# Patient Record
Sex: MALE | Race: WHITE | NOT HISPANIC OR LATINO | Employment: OTHER | URBAN - METROPOLITAN AREA
[De-identification: names, ages, dates, MRNs, and addresses within clinical notes are randomized per-mention and may not be internally consistent; named-entity substitution may affect disease eponyms.]

---

## 2022-01-01 ENCOUNTER — PATIENT OUTREACH (OUTPATIENT)
Dept: CARE COORDINATION | Facility: CLINIC | Age: 87
End: 2022-01-01

## 2022-01-01 ENCOUNTER — APPOINTMENT (OUTPATIENT)
Dept: OCCUPATIONAL THERAPY | Facility: HOSPITAL | Age: 87
DRG: 689 | End: 2022-01-01
Payer: MEDICARE

## 2022-01-01 ENCOUNTER — MEDICAL CORRESPONDENCE (OUTPATIENT)
Dept: HEALTH INFORMATION MANAGEMENT | Facility: CLINIC | Age: 87
End: 2022-01-01

## 2022-01-01 ENCOUNTER — LAB REQUISITION (OUTPATIENT)
Dept: LAB | Facility: CLINIC | Age: 87
End: 2022-01-01
Payer: MEDICARE

## 2022-01-01 ENCOUNTER — APPOINTMENT (OUTPATIENT)
Dept: PHYSICAL THERAPY | Facility: HOSPITAL | Age: 87
DRG: 689 | End: 2022-01-01
Payer: MEDICARE

## 2022-01-01 ENCOUNTER — APPOINTMENT (OUTPATIENT)
Dept: RADIOLOGY | Facility: HOSPITAL | Age: 87
End: 2022-01-01
Attending: STUDENT IN AN ORGANIZED HEALTH CARE EDUCATION/TRAINING PROGRAM
Payer: MEDICARE

## 2022-01-01 ENCOUNTER — APPOINTMENT (OUTPATIENT)
Dept: CT IMAGING | Facility: HOSPITAL | Age: 87
DRG: 689 | End: 2022-01-01
Attending: PHYSICIAN ASSISTANT
Payer: MEDICARE

## 2022-01-01 ENCOUNTER — HOSPITAL ENCOUNTER (EMERGENCY)
Facility: HOSPITAL | Age: 87
Discharge: HOME | End: 2022-11-03
Payer: MEDICARE

## 2022-01-01 ENCOUNTER — APPOINTMENT (OUTPATIENT)
Dept: CT IMAGING | Facility: HOSPITAL | Age: 87
End: 2022-01-01
Attending: STUDENT IN AN ORGANIZED HEALTH CARE EDUCATION/TRAINING PROGRAM
Payer: MEDICARE

## 2022-01-01 ENCOUNTER — APPOINTMENT (OUTPATIENT)
Dept: OCCUPATIONAL THERAPY | Facility: HOSPITAL | Age: 87
DRG: 689 | End: 2022-01-01
Attending: INTERNAL MEDICINE
Payer: MEDICARE

## 2022-01-01 ENCOUNTER — DOCUMENTATION ONLY (OUTPATIENT)
Dept: OTHER | Facility: CLINIC | Age: 87
End: 2022-01-01

## 2022-01-01 ENCOUNTER — APPOINTMENT (OUTPATIENT)
Dept: CARDIOLOGY | Facility: HOSPITAL | Age: 87
DRG: 689 | End: 2022-01-01
Attending: HOSPITALIST
Payer: MEDICARE

## 2022-01-01 ENCOUNTER — APPOINTMENT (OUTPATIENT)
Dept: PHYSICAL THERAPY | Facility: HOSPITAL | Age: 87
DRG: 689 | End: 2022-01-01
Attending: HOSPITALIST
Payer: MEDICARE

## 2022-01-01 ENCOUNTER — APPOINTMENT (OUTPATIENT)
Dept: OCCUPATIONAL THERAPY | Facility: HOSPITAL | Age: 87
DRG: 689 | End: 2022-01-01
Attending: HOSPITALIST
Payer: MEDICARE

## 2022-01-01 ENCOUNTER — HOSPITAL ENCOUNTER (INPATIENT)
Facility: HOSPITAL | Age: 87
LOS: 6 days | Discharge: NURSING, REHAB OR LONG-TERM CARE | DRG: 689 | End: 2022-11-14
Attending: EMERGENCY MEDICINE | Admitting: HOSPITALIST
Payer: MEDICARE

## 2022-01-01 ENCOUNTER — APPOINTMENT (OUTPATIENT)
Dept: RADIOLOGY | Facility: HOSPITAL | Age: 87
DRG: 689 | End: 2022-01-01
Attending: PHYSICIAN ASSISTANT
Payer: MEDICARE

## 2022-01-01 ENCOUNTER — APPOINTMENT (OUTPATIENT)
Dept: PHYSICAL THERAPY | Facility: HOSPITAL | Age: 87
DRG: 689 | End: 2022-01-01
Attending: INTERNAL MEDICINE
Payer: MEDICARE

## 2022-01-01 VITALS
RESPIRATION RATE: 18 BRPM | OXYGEN SATURATION: 96 % | HEART RATE: 67 BPM | BODY MASS INDEX: 23.09 KG/M2 | WEIGHT: 179.9 LBS | DIASTOLIC BLOOD PRESSURE: 54 MMHG | SYSTOLIC BLOOD PRESSURE: 93 MMHG | HEIGHT: 74 IN | TEMPERATURE: 97.8 F

## 2022-01-01 VITALS
RESPIRATION RATE: 18 BRPM | SYSTOLIC BLOOD PRESSURE: 109 MMHG | HEART RATE: 86 BPM | OXYGEN SATURATION: 94 % | TEMPERATURE: 98.3 F | DIASTOLIC BLOOD PRESSURE: 55 MMHG

## 2022-01-01 DIAGNOSIS — M25.562 LEFT KNEE PAIN: ICD-10-CM

## 2022-01-01 DIAGNOSIS — H54.0X44: ICD-10-CM

## 2022-01-01 DIAGNOSIS — R79.89 ELEVATED TROPONIN: ICD-10-CM

## 2022-01-01 DIAGNOSIS — M62.81 MUSCLE WEAKNESS (GENERALIZED): ICD-10-CM

## 2022-01-01 DIAGNOSIS — I10 ESSENTIAL (PRIMARY) HYPERTENSION: ICD-10-CM

## 2022-01-01 DIAGNOSIS — I48.20 CHRONIC ATRIAL FIBRILLATION, UNSPECIFIED (H): ICD-10-CM

## 2022-01-01 DIAGNOSIS — N30.00 ACUTE CYSTITIS WITHOUT HEMATURIA: ICD-10-CM
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
## 2022-01-01 DIAGNOSIS — E78.5 HYPERLIPIDEMIA, UNSPECIFIED: ICD-10-CM

## 2022-01-01 DIAGNOSIS — I48.0 PAROXYSMAL ATRIAL FIBRILLATION (H): ICD-10-CM

## 2022-01-01 DIAGNOSIS — I48.20 CHRONIC ATRIAL FIBRILLATION (H): ICD-10-CM

## 2022-01-01 DIAGNOSIS — D12.6 BENIGN NEOPLASM OF COLON, UNSPECIFIED: ICD-10-CM

## 2022-01-01 DIAGNOSIS — I10 ESSENTIAL HYPERTENSION: Primary | ICD-10-CM

## 2022-01-01 DIAGNOSIS — L89.323 PRESSURE ULCER OF LEFT BUTTOCK, STAGE 3 (H): ICD-10-CM

## 2022-01-01 DIAGNOSIS — N18.30 CKD (CHRONIC KIDNEY DISEASE) STAGE 3, GFR 30-59 ML/MIN (H): ICD-10-CM

## 2022-01-01 DIAGNOSIS — I50.9 HEART FAILURE, UNSPECIFIED (H): ICD-10-CM

## 2022-01-01 DIAGNOSIS — N39.0 URINARY TRACT INFECTION: ICD-10-CM

## 2022-01-01 DIAGNOSIS — Z95.3 S/P TAVR (TRANSCATHETER AORTIC VALVE REPLACEMENT), BIOPROSTHETIC: ICD-10-CM

## 2022-01-01 DIAGNOSIS — R79.1 SUPRATHERAPEUTIC INR: ICD-10-CM

## 2022-01-01 DIAGNOSIS — I25.10 CORONARY ARTERY DISEASE INVOLVING NATIVE CORONARY ARTERY OF NATIVE HEART WITHOUT ANGINA PECTORIS: ICD-10-CM

## 2022-01-01 LAB
ALBUMIN UR-MCNC: 70 MG/DL
ANION GAP SERPL CALCULATED.3IONS-SCNC: 10 MMOL/L (ref 7–15)
ANION GAP SERPL CALCULATED.3IONS-SCNC: 11 MMOL/L (ref 7–15)
ANION GAP SERPL CALCULATED.3IONS-SCNC: 13 MMOL/L (ref 7–15)
ANION GAP SERPL CALCULATED.3IONS-SCNC: 13 MMOL/L (ref 7–15)
ANION GAP SERPL CALCULATED.3IONS-SCNC: 14 MMOL/L (ref 7–15)
ANION GAP SERPL CALCULATED.3IONS-SCNC: 7 MMOL/L (ref 7–15)
ANION GAP SERPL CALCULATED.3IONS-SCNC: 8 MMOL/L (ref 7–15)
ANION GAP SERPL CALCULATED.3IONS-SCNC: 9 MMOL/L (ref 7–15)
ANION GAP SERPL CALCULATED.3IONS-SCNC: 9 MMOL/L (ref 7–15)
APPEARANCE UR: ABNORMAL
BACTERIA #/AREA URNS HPF: ABNORMAL /HPF
BACTERIA UR CULT: ABNORMAL
BACTERIA UR CULT: ABNORMAL
BASOPHILS # BLD AUTO: 0 10E3/UL (ref 0–0.2)
BASOPHILS NFR BLD AUTO: 0 %
BILIRUB UR QL STRIP: NEGATIVE
BUN SERPL-MCNC: 17.9 MG/DL (ref 8–23)
BUN SERPL-MCNC: 18.7 MG/DL (ref 8–23)
BUN SERPL-MCNC: 19.3 MG/DL (ref 8–23)
BUN SERPL-MCNC: 20.8 MG/DL (ref 8–23)
BUN SERPL-MCNC: 20.9 MG/DL (ref 8–23)
BUN SERPL-MCNC: 22 MG/DL (ref 8–23)
BUN SERPL-MCNC: 23 MG/DL (ref 8–23)
BUN SERPL-MCNC: 25.1 MG/DL (ref 8–23)
BUN SERPL-MCNC: 27.7 MG/DL (ref 8–23)
BUN SERPL-MCNC: 27.9 MG/DL (ref 8–23)
BUN SERPL-MCNC: 28.3 MG/DL (ref 8–23)
BUN SERPL-MCNC: 29.5 MG/DL (ref 8–23)
BUN SERPL-MCNC: 33.6 MG/DL (ref 8–23)
CALCIUM SERPL-MCNC: 8.1 MG/DL (ref 8.8–10.2)
CALCIUM SERPL-MCNC: 8.3 MG/DL (ref 8.8–10.2)
CALCIUM SERPL-MCNC: 8.3 MG/DL (ref 8.8–10.2)
CALCIUM SERPL-MCNC: 8.4 MG/DL (ref 8.8–10.2)
CALCIUM SERPL-MCNC: 8.4 MG/DL (ref 8.8–10.2)
CALCIUM SERPL-MCNC: 8.6 MG/DL (ref 8.8–10.2)
CALCIUM SERPL-MCNC: 8.7 MG/DL (ref 8.8–10.2)
CALCIUM SERPL-MCNC: 8.7 MG/DL (ref 8.8–10.2)
CALCIUM SERPL-MCNC: 8.8 MG/DL (ref 8.8–10.2)
CALCIUM SERPL-MCNC: 9 MG/DL (ref 8.8–10.2)
CALCIUM SERPL-MCNC: 9.3 MG/DL (ref 8.8–10.2)
CHLORIDE SERPL-SCNC: 100 MMOL/L (ref 98–107)
CHLORIDE SERPL-SCNC: 102 MMOL/L (ref 98–107)
CHLORIDE SERPL-SCNC: 102 MMOL/L (ref 98–107)
CHLORIDE SERPL-SCNC: 103 MMOL/L (ref 98–107)
CHLORIDE SERPL-SCNC: 104 MMOL/L (ref 98–107)
CHLORIDE SERPL-SCNC: 104 MMOL/L (ref 98–107)
CHLORIDE SERPL-SCNC: 105 MMOL/L (ref 98–107)
CHLORIDE SERPL-SCNC: 105 MMOL/L (ref 98–107)
CHLORIDE SERPL-SCNC: 106 MMOL/L (ref 98–107)
CHLORIDE SERPL-SCNC: 106 MMOL/L (ref 98–107)
CHLORIDE SERPL-SCNC: 107 MMOL/L (ref 98–107)
CHLORIDE SERPL-SCNC: 108 MMOL/L (ref 98–107)
CHLORIDE SERPL-SCNC: 108 MMOL/L (ref 98–107)
COLOR UR AUTO: YELLOW
CREAT SERPL-MCNC: 0.79 MG/DL (ref 0.67–1.17)
CREAT SERPL-MCNC: 0.85 MG/DL (ref 0.67–1.17)
CREAT SERPL-MCNC: 0.89 MG/DL (ref 0.67–1.17)
CREAT SERPL-MCNC: 0.91 MG/DL (ref 0.67–1.17)
CREAT SERPL-MCNC: 0.92 MG/DL (ref 0.67–1.17)
CREAT SERPL-MCNC: 0.92 MG/DL (ref 0.67–1.17)
CREAT SERPL-MCNC: 0.94 MG/DL (ref 0.67–1.17)
CREAT SERPL-MCNC: 0.99 MG/DL (ref 0.67–1.17)
CREAT SERPL-MCNC: 1.02 MG/DL (ref 0.67–1.17)
CREAT SERPL-MCNC: 1.03 MG/DL (ref 0.67–1.17)
CREAT SERPL-MCNC: 1.05 MG/DL (ref 0.67–1.17)
CREAT SERPL-MCNC: 1.28 MG/DL (ref 0.67–1.17)
CREAT SERPL-MCNC: 1.3 MG/DL (ref 0.67–1.17)
DEPRECATED HCO3 PLAS-SCNC: 21 MMOL/L (ref 22–29)
DEPRECATED HCO3 PLAS-SCNC: 22 MMOL/L (ref 22–29)
DEPRECATED HCO3 PLAS-SCNC: 23 MMOL/L (ref 22–29)
DEPRECATED HCO3 PLAS-SCNC: 24 MMOL/L (ref 22–29)
DEPRECATED HCO3 PLAS-SCNC: 25 MMOL/L (ref 22–29)
DEPRECATED HCO3 PLAS-SCNC: 25 MMOL/L (ref 22–29)
EOSINOPHIL # BLD AUTO: 0.1 10E3/UL (ref 0–0.7)
EOSINOPHIL NFR BLD AUTO: 1 %
ERYTHROCYTE [DISTWIDTH] IN BLOOD BY AUTOMATED COUNT: 16.9 % (ref 10–15)
ERYTHROCYTE [DISTWIDTH] IN BLOOD BY AUTOMATED COUNT: 17 % (ref 10–15)
ERYTHROCYTE [DISTWIDTH] IN BLOOD BY AUTOMATED COUNT: 17.1 % (ref 10–15)
ERYTHROCYTE [DISTWIDTH] IN BLOOD BY AUTOMATED COUNT: 17.1 % (ref 10–15)
ERYTHROCYTE [DISTWIDTH] IN BLOOD BY AUTOMATED COUNT: 17.2 % (ref 10–15)
ERYTHROCYTE [DISTWIDTH] IN BLOOD BY AUTOMATED COUNT: 17.5 % (ref 10–15)
ERYTHROCYTE [DISTWIDTH] IN BLOOD BY AUTOMATED COUNT: 17.8 % (ref 10–15)
GFR SERPL CREATININE-BSD FRML MDRD: 53 ML/MIN/1.73M2
GFR SERPL CREATININE-BSD FRML MDRD: 53 ML/MIN/1.73M2
GFR SERPL CREATININE-BSD FRML MDRD: 68 ML/MIN/1.73M2
GFR SERPL CREATININE-BSD FRML MDRD: 69 ML/MIN/1.73M2
GFR SERPL CREATININE-BSD FRML MDRD: 70 ML/MIN/1.73M2
GFR SERPL CREATININE-BSD FRML MDRD: 73 ML/MIN/1.73M2
GFR SERPL CREATININE-BSD FRML MDRD: 77 ML/MIN/1.73M2
GFR SERPL CREATININE-BSD FRML MDRD: 80 ML/MIN/1.73M2
GFR SERPL CREATININE-BSD FRML MDRD: 80 ML/MIN/1.73M2
GFR SERPL CREATININE-BSD FRML MDRD: 81 ML/MIN/1.73M2
GFR SERPL CREATININE-BSD FRML MDRD: 82 ML/MIN/1.73M2
GFR SERPL CREATININE-BSD FRML MDRD: 83 ML/MIN/1.73M2
GFR SERPL CREATININE-BSD FRML MDRD: 85 ML/MIN/1.73M2
GLUCOSE SERPL-MCNC: 110 MG/DL (ref 70–99)
GLUCOSE SERPL-MCNC: 111 MG/DL (ref 70–99)
GLUCOSE SERPL-MCNC: 74 MG/DL (ref 70–99)
GLUCOSE SERPL-MCNC: 81 MG/DL (ref 70–99)
GLUCOSE SERPL-MCNC: 82 MG/DL (ref 70–99)
GLUCOSE SERPL-MCNC: 83 MG/DL (ref 70–99)
GLUCOSE SERPL-MCNC: 84 MG/DL (ref 70–99)
GLUCOSE SERPL-MCNC: 86 MG/DL (ref 70–99)
GLUCOSE SERPL-MCNC: 88 MG/DL (ref 70–99)
GLUCOSE SERPL-MCNC: 90 MG/DL (ref 70–99)
GLUCOSE SERPL-MCNC: 93 MG/DL (ref 70–99)
GLUCOSE SERPL-MCNC: 95 MG/DL (ref 70–99)
GLUCOSE SERPL-MCNC: 95 MG/DL (ref 70–99)
GLUCOSE UR STRIP-MCNC: NEGATIVE MG/DL
HCT VFR BLD AUTO: 30.2 % (ref 40–53)
HCT VFR BLD AUTO: 31.1 % (ref 40–53)
HCT VFR BLD AUTO: 31.2 % (ref 40–53)
HCT VFR BLD AUTO: 31.5 % (ref 40–53)
HCT VFR BLD AUTO: 31.6 % (ref 40–53)
HCT VFR BLD AUTO: 32.2 % (ref 40–53)
HCT VFR BLD AUTO: 32.5 % (ref 40–53)
HCT VFR BLD AUTO: 33.7 % (ref 40–53)
HCT VFR BLD AUTO: 33.8 % (ref 40–53)
HCT VFR BLD AUTO: 34.6 % (ref 40–53)
HCT VFR BLD AUTO: 34.7 % (ref 40–53)
HCT VFR BLD AUTO: 35.5 % (ref 40–53)
HCT VFR BLD AUTO: 37.6 % (ref 40–53)
HGB BLD-MCNC: 10 G/DL (ref 13.3–17.7)
HGB BLD-MCNC: 10.2 G/DL (ref 13.3–17.7)
HGB BLD-MCNC: 10.3 G/DL (ref 13.3–17.7)
HGB BLD-MCNC: 10.5 G/DL (ref 13.3–17.7)
HGB BLD-MCNC: 10.8 G/DL (ref 13.3–17.7)
HGB BLD-MCNC: 10.9 G/DL (ref 13.3–17.7)
HGB BLD-MCNC: 11.1 G/DL (ref 13.3–17.7)
HGB BLD-MCNC: 11.3 G/DL (ref 13.3–17.7)
HGB BLD-MCNC: 12.1 G/DL (ref 13.3–17.7)
HGB BLD-MCNC: 9.8 G/DL (ref 13.3–17.7)
HGB BLD-MCNC: 9.9 G/DL (ref 13.3–17.7)
HGB UR QL STRIP: ABNORMAL
IMM GRANULOCYTES # BLD: 0 10E3/UL
IMM GRANULOCYTES NFR BLD: 1 %
INR PPP: 1.24 (ref 0.85–1.15)
INR PPP: 1.37 (ref 0.85–1.15)
INR PPP: 1.52 (ref 0.85–1.15)
INR PPP: 1.58 (ref 0.85–1.15)
INR PPP: 1.82 (ref 0.85–1.15)
INR PPP: 2.01 (ref 0.85–1.15)
INR PPP: 2.06 (ref 0.85–1.15)
INR PPP: 2.12 (ref 0.85–1.15)
INR PPP: 2.32 (ref 0.85–1.15)
INR PPP: 2.34 (ref 0.85–1.15)
INR PPP: 2.38 (ref 0.85–1.15)
INR PPP: 2.41 (ref 0.85–1.15)
INR PPP: 2.44 (ref 0.85–1.15)
INR PPP: 2.53 (ref 0.85–1.15)
INR PPP: 2.6 (ref 0.85–1.15)
INR PPP: 2.67 (ref 0.85–1.15)
INR PPP: 2.7 (ref 0.85–1.15)
INR PPP: 2.72 (ref 0.85–1.15)
INR PPP: 3.16 (ref 0.85–1.15)
INR PPP: 3.52 (ref 0.85–1.15)
INR PPP: 3.78 (ref 0.85–1.15)
INR PPP: 3.91 (ref 0.85–1.15)
INR PPP: 4.07 (ref 0.85–1.15)
INR PPP: 4.57 (ref 0.85–1.15)
INR PPP: 5.28 (ref 0.85–1.15)
KETONES UR STRIP-MCNC: NEGATIVE MG/DL
LACTATE SERPL-SCNC: 1.4 MMOL/L (ref 0.7–2)
LEUKOCYTE ESTERASE UR QL STRIP: ABNORMAL
LVEF ECHO: NORMAL
LYMPHOCYTES # BLD AUTO: 1.1 10E3/UL (ref 0.8–5.3)
LYMPHOCYTES NFR BLD AUTO: 17 %
MAGNESIUM SERPL-MCNC: 1.9 MG/DL (ref 1.7–2.3)
MAGNESIUM SERPL-MCNC: 2 MG/DL (ref 1.7–2.3)
MAGNESIUM SERPL-MCNC: 2.1 MG/DL (ref 1.7–2.3)
MAGNESIUM SERPL-MCNC: 2.2 MG/DL (ref 1.7–2.3)
MAGNESIUM SERPL-MCNC: 2.2 MG/DL (ref 1.7–2.3)
MCH RBC QN AUTO: 27.6 PG (ref 26.5–33)
MCH RBC QN AUTO: 27.7 PG (ref 26.5–33)
MCH RBC QN AUTO: 27.8 PG (ref 26.5–33)
MCH RBC QN AUTO: 27.9 PG (ref 26.5–33)
MCH RBC QN AUTO: 27.9 PG (ref 26.5–33)
MCH RBC QN AUTO: 28 PG (ref 26.5–33)
MCH RBC QN AUTO: 28.1 PG (ref 26.5–33)
MCH RBC QN AUTO: 28.2 PG (ref 26.5–33)
MCH RBC QN AUTO: 28.2 PG (ref 26.5–33)
MCHC RBC AUTO-ENTMCNC: 31.1 G/DL (ref 31.5–36.5)
MCHC RBC AUTO-ENTMCNC: 31.2 G/DL (ref 31.5–36.5)
MCHC RBC AUTO-ENTMCNC: 31.3 G/DL (ref 31.5–36.5)
MCHC RBC AUTO-ENTMCNC: 31.3 G/DL (ref 31.5–36.5)
MCHC RBC AUTO-ENTMCNC: 31.4 G/DL (ref 31.5–36.5)
MCHC RBC AUTO-ENTMCNC: 31.4 G/DL (ref 31.5–36.5)
MCHC RBC AUTO-ENTMCNC: 31.8 G/DL (ref 31.5–36.5)
MCHC RBC AUTO-ENTMCNC: 32 G/DL (ref 31.5–36.5)
MCHC RBC AUTO-ENTMCNC: 32.1 G/DL (ref 31.5–36.5)
MCHC RBC AUTO-ENTMCNC: 32.2 G/DL (ref 31.5–36.5)
MCHC RBC AUTO-ENTMCNC: 32.3 G/DL (ref 31.5–36.5)
MCHC RBC AUTO-ENTMCNC: 32.5 G/DL (ref 31.5–36.5)
MCHC RBC AUTO-ENTMCNC: 32.6 G/DL (ref 31.5–36.5)
MCV RBC AUTO: 87 FL (ref 78–100)
MCV RBC AUTO: 88 FL (ref 78–100)
MCV RBC AUTO: 88 FL (ref 78–100)
MCV RBC AUTO: 89 FL (ref 78–100)
MCV RBC AUTO: 90 FL (ref 78–100)
MONOCYTES # BLD AUTO: 0.7 10E3/UL (ref 0–1.3)
MONOCYTES NFR BLD AUTO: 10 %
NEUTROPHILS # BLD AUTO: 4.8 10E3/UL (ref 1.6–8.3)
NEUTROPHILS NFR BLD AUTO: 71 %
NITRATE UR QL: NEGATIVE
NRBC # BLD AUTO: 0 10E3/UL
NRBC BLD AUTO-RTO: 0 /100
PH UR STRIP: 8 [PH] (ref 5–7)
PLATELET # BLD AUTO: 192 10E3/UL (ref 150–450)
PLATELET # BLD AUTO: 220 10E3/UL (ref 150–450)
PLATELET # BLD AUTO: 220 10E3/UL (ref 150–450)
PLATELET # BLD AUTO: 225 10E3/UL (ref 150–450)
PLATELET # BLD AUTO: 230 10E3/UL (ref 150–450)
PLATELET # BLD AUTO: 234 10E3/UL (ref 150–450)
PLATELET # BLD AUTO: 235 10E3/UL (ref 150–450)
PLATELET # BLD AUTO: 239 10E3/UL (ref 150–450)
PLATELET # BLD AUTO: 244 10E3/UL (ref 150–450)
PLATELET # BLD AUTO: 251 10E3/UL (ref 150–450)
PLATELET # BLD AUTO: 253 10E3/UL (ref 150–450)
PLATELET # BLD AUTO: 288 10E3/UL (ref 150–450)
PLATELET # BLD AUTO: 303 10E3/UL (ref 150–450)
POTASSIUM SERPL-SCNC: 3.2 MMOL/L (ref 3.4–5.3)
POTASSIUM SERPL-SCNC: 3.3 MMOL/L (ref 3.4–5.3)
POTASSIUM SERPL-SCNC: 3.5 MMOL/L (ref 3.4–5.3)
POTASSIUM SERPL-SCNC: 3.5 MMOL/L (ref 3.4–5.3)
POTASSIUM SERPL-SCNC: 3.7 MMOL/L (ref 3.4–5.3)
POTASSIUM SERPL-SCNC: 3.8 MMOL/L (ref 3.4–5.3)
POTASSIUM SERPL-SCNC: 3.8 MMOL/L (ref 3.4–5.3)
POTASSIUM SERPL-SCNC: 3.9 MMOL/L (ref 3.4–5.3)
POTASSIUM SERPL-SCNC: 4 MMOL/L (ref 3.4–5.3)
POTASSIUM SERPL-SCNC: 4.1 MMOL/L (ref 3.4–5.3)
POTASSIUM SERPL-SCNC: 4.2 MMOL/L (ref 3.4–5.3)
RBC # BLD AUTO: 3.47 10E6/UL (ref 4.4–5.9)
RBC # BLD AUTO: 3.52 10E6/UL (ref 4.4–5.9)
RBC # BLD AUTO: 3.56 10E6/UL (ref 4.4–5.9)
RBC # BLD AUTO: 3.56 10E6/UL (ref 4.4–5.9)
RBC # BLD AUTO: 3.59 10E6/UL (ref 4.4–5.9)
RBC # BLD AUTO: 3.66 10E6/UL (ref 4.4–5.9)
RBC # BLD AUTO: 3.69 10E6/UL (ref 4.4–5.9)
RBC # BLD AUTO: 3.79 10E6/UL (ref 4.4–5.9)
RBC # BLD AUTO: 3.88 10E6/UL (ref 4.4–5.9)
RBC # BLD AUTO: 3.89 10E6/UL (ref 4.4–5.9)
RBC # BLD AUTO: 3.99 10E6/UL (ref 4.4–5.9)
RBC # BLD AUTO: 4.01 10E6/UL (ref 4.4–5.9)
RBC # BLD AUTO: 4.3 10E6/UL (ref 4.4–5.9)
RBC URINE: 100 /HPF
SARS-COV-2 RNA RESP QL NAA+PROBE: NEGATIVE
SODIUM SERPL-SCNC: 132 MMOL/L (ref 136–145)
SODIUM SERPL-SCNC: 134 MMOL/L (ref 136–145)
SODIUM SERPL-SCNC: 137 MMOL/L (ref 136–145)
SODIUM SERPL-SCNC: 137 MMOL/L (ref 136–145)
SODIUM SERPL-SCNC: 138 MMOL/L (ref 136–145)
SODIUM SERPL-SCNC: 138 MMOL/L (ref 136–145)
SODIUM SERPL-SCNC: 139 MMOL/L (ref 136–145)
SODIUM SERPL-SCNC: 139 MMOL/L (ref 136–145)
SODIUM SERPL-SCNC: 140 MMOL/L (ref 136–145)
SODIUM SERPL-SCNC: 141 MMOL/L (ref 136–145)
SODIUM SERPL-SCNC: 141 MMOL/L (ref 136–145)
SP GR UR STRIP: 1.01 (ref 1–1.03)
SQUAMOUS EPITHELIAL: 1 /HPF
TROPONIN T SERPL HS-MCNC: 121 NG/L
TROPONIN T SERPL HS-MCNC: 123 NG/L
TSH SERPL DL<=0.005 MIU/L-ACNC: 2.39 UIU/ML (ref 0.3–4.2)
UROBILINOGEN UR STRIP-MCNC: <2 MG/DL
VIT B1 PYROPHOSHATE BLD-SCNC: 52 NMOL/L
VIT B12 SERPL-MCNC: 259 PG/ML (ref 232–1245)
WBC # BLD AUTO: 5.4 10E3/UL (ref 4–11)
WBC # BLD AUTO: 6.1 10E3/UL (ref 4–11)
WBC # BLD AUTO: 6.5 10E3/UL (ref 4–11)
WBC # BLD AUTO: 6.5 10E3/UL (ref 4–11)
WBC # BLD AUTO: 6.7 10E3/UL (ref 4–11)
WBC # BLD AUTO: 7 10E3/UL (ref 4–11)
WBC # BLD AUTO: 7.2 10E3/UL (ref 4–11)
WBC # BLD AUTO: 7.2 10E3/UL (ref 4–11)
WBC # BLD AUTO: 7.7 10E3/UL (ref 4–11)
WBC # BLD AUTO: 7.7 10E3/UL (ref 4–11)
WBC # BLD AUTO: 7.8 10E3/UL (ref 4–11)
WBC # BLD AUTO: 7.9 10E3/UL (ref 4–11)
WBC # BLD AUTO: 8 10E3/UL (ref 4–11)
WBC CLUMPS #/AREA URNS HPF: PRESENT /HPF
WBC URINE: >182 /HPF

## 2022-01-01 PROCEDURE — 36415 COLL VENOUS BLD VENIPUNCTURE: CPT | Performed by: STUDENT IN AN ORGANIZED HEALTH CARE EDUCATION/TRAINING PROGRAM

## 2022-01-01 PROCEDURE — 250N000013 HC RX MED GY IP 250 OP 250 PS 637: Performed by: HOSPITALIST

## 2022-01-01 PROCEDURE — P9603 ONE-WAY ALLOW PRORATED MILES: HCPCS | Mod: ORL | Performed by: FAMILY MEDICINE

## 2022-01-01 PROCEDURE — 93005 ELECTROCARDIOGRAM TRACING: CPT

## 2022-01-01 PROCEDURE — 80048 BASIC METABOLIC PNL TOTAL CA: CPT | Performed by: INTERNAL MEDICINE

## 2022-01-01 PROCEDURE — 85610 PROTHROMBIN TIME: CPT | Mod: ORL | Performed by: NURSE PRACTITIONER

## 2022-01-01 PROCEDURE — 250N000013 HC RX MED GY IP 250 OP 250 PS 637: Performed by: PHYSICIAN ASSISTANT

## 2022-01-01 PROCEDURE — 36415 COLL VENOUS BLD VENIPUNCTURE: CPT | Performed by: FAMILY MEDICINE

## 2022-01-01 PROCEDURE — 250N000011 HC RX IP 250 OP 636: Performed by: HOSPITALIST

## 2022-01-01 PROCEDURE — 83735 ASSAY OF MAGNESIUM: CPT | Performed by: INTERNAL MEDICINE

## 2022-01-01 PROCEDURE — 85610 PROTHROMBIN TIME: CPT | Performed by: HOSPITALIST

## 2022-01-01 PROCEDURE — 93005 ELECTROCARDIOGRAM TRACING: CPT | Performed by: STUDENT IN AN ORGANIZED HEALTH CARE EDUCATION/TRAINING PROGRAM

## 2022-01-01 PROCEDURE — 250N000013 HC RX MED GY IP 250 OP 250 PS 637: Performed by: INTERNAL MEDICINE

## 2022-01-01 PROCEDURE — G0463 HOSPITAL OUTPT CLINIC VISIT: HCPCS

## 2022-01-01 PROCEDURE — 36415 COLL VENOUS BLD VENIPUNCTURE: CPT | Performed by: NURSE PRACTITIONER

## 2022-01-01 PROCEDURE — G1010 CDSM STANSON: HCPCS

## 2022-01-01 PROCEDURE — 85610 PROTHROMBIN TIME: CPT | Performed by: STUDENT IN AN ORGANIZED HEALTH CARE EDUCATION/TRAINING PROGRAM

## 2022-01-01 PROCEDURE — 99232 SBSQ HOSP IP/OBS MODERATE 35: CPT | Performed by: HOSPITALIST

## 2022-01-01 PROCEDURE — 85027 COMPLETE CBC AUTOMATED: CPT | Performed by: NURSE PRACTITIONER

## 2022-01-01 PROCEDURE — 97535 SELF CARE MNGMENT TRAINING: CPT | Mod: GO

## 2022-01-01 PROCEDURE — 80048 BASIC METABOLIC PNL TOTAL CA: CPT | Performed by: NURSE PRACTITIONER

## 2022-01-01 PROCEDURE — P9604 ONE-WAY ALLOW PRORATED TRIP: HCPCS | Performed by: NURSE PRACTITIONER

## 2022-01-01 PROCEDURE — 36415 COLL VENOUS BLD VENIPUNCTURE: CPT | Mod: ORL | Performed by: NURSE PRACTITIONER

## 2022-01-01 PROCEDURE — 85014 HEMATOCRIT: CPT | Performed by: INTERNAL MEDICINE

## 2022-01-01 PROCEDURE — 120N000001 HC R&B MED SURG/OB

## 2022-01-01 PROCEDURE — P9604 ONE-WAY ALLOW PRORATED TRIP: HCPCS | Mod: ORL | Performed by: NURSE PRACTITIONER

## 2022-01-01 PROCEDURE — 36415 COLL VENOUS BLD VENIPUNCTURE: CPT | Performed by: INTERNAL MEDICINE

## 2022-01-01 PROCEDURE — 80048 BASIC METABOLIC PNL TOTAL CA: CPT | Performed by: PHYSICIAN ASSISTANT

## 2022-01-01 PROCEDURE — 36415 COLL VENOUS BLD VENIPUNCTURE: CPT | Mod: ORL | Performed by: FAMILY MEDICINE

## 2022-01-01 PROCEDURE — 85027 COMPLETE CBC AUTOMATED: CPT | Performed by: INTERNAL MEDICINE

## 2022-01-01 PROCEDURE — 73610 X-RAY EXAM OF ANKLE: CPT | Mod: LT

## 2022-01-01 PROCEDURE — 84484 ASSAY OF TROPONIN QUANT: CPT | Performed by: PHYSICIAN ASSISTANT

## 2022-01-01 PROCEDURE — 83735 ASSAY OF MAGNESIUM: CPT | Performed by: HOSPITALIST

## 2022-01-01 PROCEDURE — 82607 VITAMIN B-12: CPT | Performed by: INTERNAL MEDICINE

## 2022-01-01 PROCEDURE — 85025 COMPLETE CBC W/AUTO DIFF WBC: CPT | Performed by: HOSPITALIST

## 2022-01-01 PROCEDURE — 84425 ASSAY OF VITAMIN B-1: CPT | Performed by: INTERNAL MEDICINE

## 2022-01-01 PROCEDURE — 85610 PROTHROMBIN TIME: CPT | Mod: ORL | Performed by: FAMILY MEDICINE

## 2022-01-01 PROCEDURE — 36415 COLL VENOUS BLD VENIPUNCTURE: CPT | Performed by: HOSPITALIST

## 2022-01-01 PROCEDURE — 258N000003 HC RX IP 258 OP 636

## 2022-01-01 PROCEDURE — 80048 BASIC METABOLIC PNL TOTAL CA: CPT | Performed by: STUDENT IN AN ORGANIZED HEALTH CARE EDUCATION/TRAINING PROGRAM

## 2022-01-01 PROCEDURE — 255N000002 HC RX 255 OP 636: Performed by: HOSPITALIST

## 2022-01-01 PROCEDURE — 85027 COMPLETE CBC AUTOMATED: CPT | Performed by: PHYSICIAN ASSISTANT

## 2022-01-01 PROCEDURE — 97110 THERAPEUTIC EXERCISES: CPT | Mod: GP

## 2022-01-01 PROCEDURE — 99207 PR NO CHARGE LOS: CPT | Performed by: INTERNAL MEDICINE

## 2022-01-01 PROCEDURE — P9604 ONE-WAY ALLOW PRORATED TRIP: HCPCS | Performed by: FAMILY MEDICINE

## 2022-01-01 PROCEDURE — 36415 COLL VENOUS BLD VENIPUNCTURE: CPT | Performed by: EMERGENCY MEDICINE

## 2022-01-01 PROCEDURE — 250N000011 HC RX IP 250 OP 636: Performed by: PHYSICIAN ASSISTANT

## 2022-01-01 PROCEDURE — 84132 ASSAY OF SERUM POTASSIUM: CPT | Performed by: INTERNAL MEDICINE

## 2022-01-01 PROCEDURE — 84443 ASSAY THYROID STIM HORMONE: CPT | Performed by: INTERNAL MEDICINE

## 2022-01-01 PROCEDURE — 97162 PT EVAL MOD COMPLEX 30 MIN: CPT | Mod: GP

## 2022-01-01 PROCEDURE — 73560 X-RAY EXAM OF KNEE 1 OR 2: CPT | Mod: LT

## 2022-01-01 PROCEDURE — P9604 ONE-WAY ALLOW PRORATED TRIP: HCPCS | Mod: ORL | Performed by: FAMILY MEDICINE

## 2022-01-01 PROCEDURE — 99232 SBSQ HOSP IP/OBS MODERATE 35: CPT | Performed by: INTERNAL MEDICINE

## 2022-01-01 PROCEDURE — 85027 COMPLETE CBC AUTOMATED: CPT | Performed by: FAMILY MEDICINE

## 2022-01-01 PROCEDURE — 97530 THERAPEUTIC ACTIVITIES: CPT | Mod: GP

## 2022-01-01 PROCEDURE — 97110 THERAPEUTIC EXERCISES: CPT | Mod: GO

## 2022-01-01 PROCEDURE — P9603 ONE-WAY ALLOW PRORATED MILES: HCPCS | Mod: ORL | Performed by: NURSE PRACTITIONER

## 2022-01-01 PROCEDURE — 99285 EMERGENCY DEPT VISIT HI MDM: CPT | Mod: 25

## 2022-01-01 PROCEDURE — 80048 BASIC METABOLIC PNL TOTAL CA: CPT | Performed by: FAMILY MEDICINE

## 2022-01-01 PROCEDURE — C9803 HOPD COVID-19 SPEC COLLECT: HCPCS

## 2022-01-01 PROCEDURE — 85610 PROTHROMBIN TIME: CPT | Performed by: PHYSICIAN ASSISTANT

## 2022-01-01 PROCEDURE — 99223 1ST HOSP IP/OBS HIGH 75: CPT | Performed by: HOSPITALIST

## 2022-01-01 PROCEDURE — 93306 TTE W/DOPPLER COMPLETE: CPT | Mod: 26 | Performed by: GENERAL ACUTE CARE HOSPITAL

## 2022-01-01 PROCEDURE — 80048 BASIC METABOLIC PNL TOTAL CA: CPT | Performed by: HOSPITALIST

## 2022-01-01 PROCEDURE — 97166 OT EVAL MOD COMPLEX 45 MIN: CPT | Mod: GO

## 2022-01-01 PROCEDURE — 71045 X-RAY EXAM CHEST 1 VIEW: CPT

## 2022-01-01 PROCEDURE — 83735 ASSAY OF MAGNESIUM: CPT | Performed by: FAMILY MEDICINE

## 2022-01-01 PROCEDURE — 87088 URINE BACTERIA CULTURE: CPT | Performed by: PHYSICIAN ASSISTANT

## 2022-01-01 PROCEDURE — 99238 HOSP IP/OBS DSCHRG MGMT 30/<: CPT | Performed by: INTERNAL MEDICINE

## 2022-01-01 PROCEDURE — U0005 INFEC AGEN DETEC AMPLI PROBE: HCPCS | Performed by: PHYSICIAN ASSISTANT

## 2022-01-01 PROCEDURE — 36415 COLL VENOUS BLD VENIPUNCTURE: CPT | Performed by: PHYSICIAN ASSISTANT

## 2022-01-01 PROCEDURE — 72131 CT LUMBAR SPINE W/O DYE: CPT | Mod: ME

## 2022-01-01 PROCEDURE — 85027 COMPLETE CBC AUTOMATED: CPT | Performed by: STUDENT IN AN ORGANIZED HEALTH CARE EDUCATION/TRAINING PROGRAM

## 2022-01-01 PROCEDURE — 81003 URINALYSIS AUTO W/O SCOPE: CPT | Performed by: PHYSICIAN ASSISTANT

## 2022-01-01 PROCEDURE — 99223 1ST HOSP IP/OBS HIGH 75: CPT | Mod: 25 | Performed by: GENERAL ACUTE CARE HOSPITAL

## 2022-01-01 PROCEDURE — 93005 ELECTROCARDIOGRAM TRACING: CPT | Performed by: PHYSICIAN ASSISTANT

## 2022-01-01 PROCEDURE — 83605 ASSAY OF LACTIC ACID: CPT | Performed by: EMERGENCY MEDICINE

## 2022-01-01 PROCEDURE — 84484 ASSAY OF TROPONIN QUANT: CPT | Performed by: HOSPITALIST

## 2022-01-01 RX ORDER — BISACODYL 10 MG
10 SUPPOSITORY, RECTAL RECTAL DAILY PRN
Status: DISCONTINUED | OUTPATIENT
Start: 2022-01-01 | End: 2022-01-01 | Stop reason: HOSPADM

## 2022-01-01 RX ORDER — POTASSIUM CHLORIDE 1500 MG/1
40 TABLET, EXTENDED RELEASE ORAL ONCE
Status: COMPLETED | OUTPATIENT
Start: 2022-01-01 | End: 2022-01-01

## 2022-01-01 RX ORDER — WARFARIN SODIUM 1 MG/1
1 TABLET ORAL
Status: DISCONTINUED | OUTPATIENT
Start: 2022-01-01 | End: 2022-01-01 | Stop reason: HOSPADM

## 2022-01-01 RX ORDER — FUROSEMIDE 20 MG
20 TABLET ORAL 2 TIMES DAILY
COMMUNITY

## 2022-01-01 RX ORDER — VIT C/E/ZN/COPPR/LUTEIN/ZEAXAN 60 MG-6 MG
1 CAPSULE ORAL 2 TIMES DAILY
Status: DISCONTINUED | OUTPATIENT
Start: 2022-01-01 | End: 2022-01-01 | Stop reason: HOSPADM

## 2022-01-01 RX ORDER — ASPIRIN 81 MG/1
81 TABLET ORAL DAILY
Status: DISCONTINUED | OUTPATIENT
Start: 2022-01-01 | End: 2022-01-01 | Stop reason: HOSPADM

## 2022-01-01 RX ORDER — METOPROLOL TARTRATE 25 MG/1
100 TABLET, FILM COATED ORAL DAILY
Status: DISCONTINUED | OUTPATIENT
Start: 2022-01-01 | End: 2022-01-01 | Stop reason: HOSPADM

## 2022-01-01 RX ORDER — ACETAMINOPHEN 650 MG/1
650 SUPPOSITORY RECTAL EVERY 6 HOURS PRN
Status: DISCONTINUED | OUTPATIENT
Start: 2022-01-01 | End: 2022-01-01 | Stop reason: HOSPADM

## 2022-01-01 RX ORDER — CEFTRIAXONE 1 G/1
1 INJECTION, POWDER, FOR SOLUTION INTRAMUSCULAR; INTRAVENOUS EVERY 24 HOURS
Status: DISCONTINUED | OUTPATIENT
Start: 2022-01-01 | End: 2022-01-01

## 2022-01-01 RX ORDER — PROCHLORPERAZINE MALEATE 5 MG
5 TABLET ORAL EVERY 6 HOURS PRN
Status: DISCONTINUED | OUTPATIENT
Start: 2022-01-01 | End: 2022-01-01 | Stop reason: HOSPADM

## 2022-01-01 RX ORDER — CEFDINIR 300 MG/1
300 CAPSULE ORAL EVERY 12 HOURS SCHEDULED
Status: DISCONTINUED | OUTPATIENT
Start: 2022-01-01 | End: 2022-01-01 | Stop reason: HOSPADM

## 2022-01-01 RX ORDER — LIDOCAINE 40 MG/G
CREAM TOPICAL
Status: DISCONTINUED | OUTPATIENT
Start: 2022-01-01 | End: 2022-01-01 | Stop reason: HOSPADM

## 2022-01-01 RX ORDER — WARFARIN SODIUM 2.5 MG/1
2.5 TABLET ORAL
Status: COMPLETED | OUTPATIENT
Start: 2022-01-01 | End: 2022-01-01

## 2022-01-01 RX ORDER — FINASTERIDE 5 MG/1
5 TABLET, FILM COATED ORAL DAILY
Status: DISCONTINUED | OUTPATIENT
Start: 2022-01-01 | End: 2022-01-01 | Stop reason: HOSPADM

## 2022-01-01 RX ORDER — FOLIC ACID 1 MG/1
1 TABLET ORAL DAILY
Status: DISCONTINUED | OUTPATIENT
Start: 2022-01-01 | End: 2022-01-01 | Stop reason: HOSPADM

## 2022-01-01 RX ORDER — MAGNESIUM SULFATE HEPTAHYDRATE 40 MG/ML
2 INJECTION, SOLUTION INTRAVENOUS ONCE
Status: COMPLETED | OUTPATIENT
Start: 2022-01-01 | End: 2022-01-01

## 2022-01-01 RX ORDER — ASPIRIN 81 MG/1
81 TABLET ORAL DAILY
Status: ON HOLD | COMMUNITY
End: 2022-01-01

## 2022-01-01 RX ORDER — TAMSULOSIN HYDROCHLORIDE 0.4 MG/1
0.4 CAPSULE ORAL DAILY
COMMUNITY

## 2022-01-01 RX ORDER — LANOLIN ALCOHOL/MO/W.PET/CERES
100 CREAM (GRAM) TOPICAL DAILY
Start: 2022-01-01

## 2022-01-01 RX ORDER — POTASSIUM CHLORIDE 1.5 G/1.58G
40 POWDER, FOR SOLUTION ORAL ONCE
Status: COMPLETED | OUTPATIENT
Start: 2022-01-01 | End: 2022-01-01

## 2022-01-01 RX ORDER — WARFARIN SODIUM 2 MG/1
2 TABLET ORAL
Status: COMPLETED | OUTPATIENT
Start: 2022-01-01 | End: 2022-01-01

## 2022-01-01 RX ORDER — ACETAMINOPHEN 325 MG/1
650 TABLET ORAL EVERY 6 HOURS PRN
Status: DISCONTINUED | OUTPATIENT
Start: 2022-01-01 | End: 2022-01-01 | Stop reason: HOSPADM

## 2022-01-01 RX ORDER — PROCHLORPERAZINE 25 MG
12.5 SUPPOSITORY, RECTAL RECTAL EVERY 12 HOURS PRN
Status: DISCONTINUED | OUTPATIENT
Start: 2022-01-01 | End: 2022-01-01 | Stop reason: HOSPADM

## 2022-01-01 RX ORDER — CEFTRIAXONE 1 G/1
1 INJECTION, POWDER, FOR SOLUTION INTRAMUSCULAR; INTRAVENOUS ONCE
Status: COMPLETED | OUTPATIENT
Start: 2022-01-01 | End: 2022-01-01

## 2022-01-01 RX ORDER — METOPROLOL TARTRATE 100 MG
150 TABLET ORAL DAILY
Status: ON HOLD | COMMUNITY
End: 2022-01-01

## 2022-01-01 RX ORDER — CEFDINIR 300 MG/1
300 CAPSULE ORAL ONCE
Qty: 1 CAPSULE | Refills: 0
Start: 2022-01-01 | End: 2022-01-01

## 2022-01-01 RX ORDER — FUROSEMIDE 20 MG
20 TABLET ORAL 2 TIMES DAILY
Status: DISCONTINUED | OUTPATIENT
Start: 2022-01-01 | End: 2022-01-01 | Stop reason: HOSPADM

## 2022-01-01 RX ORDER — AMOXICILLIN 250 MG
2 CAPSULE ORAL 2 TIMES DAILY PRN
Status: DISCONTINUED | OUTPATIENT
Start: 2022-01-01 | End: 2022-01-01 | Stop reason: HOSPADM

## 2022-01-01 RX ORDER — POTASSIUM CHLORIDE 750 MG/1
10 TABLET, EXTENDED RELEASE ORAL DAILY
Status: DISCONTINUED | OUTPATIENT
Start: 2022-01-01 | End: 2022-01-01 | Stop reason: HOSPADM

## 2022-01-01 RX ORDER — FOLIC ACID 1 MG/1
1 TABLET ORAL DAILY
Start: 2022-01-01

## 2022-01-01 RX ORDER — VIT A/VIT C/VIT E/ZINC/COPPER 2148-113
2 TABLET ORAL 2 TIMES DAILY
COMMUNITY

## 2022-01-01 RX ORDER — TAMSULOSIN HYDROCHLORIDE 0.4 MG/1
0.4 CAPSULE ORAL DAILY
Status: DISCONTINUED | OUTPATIENT
Start: 2022-01-01 | End: 2022-01-01 | Stop reason: HOSPADM

## 2022-01-01 RX ORDER — SIMVASTATIN 40 MG
40 TABLET ORAL DAILY
COMMUNITY

## 2022-01-01 RX ORDER — AMOXICILLIN 250 MG
1 CAPSULE ORAL 2 TIMES DAILY PRN
Status: DISCONTINUED | OUTPATIENT
Start: 2022-01-01 | End: 2022-01-01 | Stop reason: HOSPADM

## 2022-01-01 RX ORDER — METOPROLOL TARTRATE 100 MG
100 TABLET ORAL DAILY
Start: 2022-01-01

## 2022-01-01 RX ORDER — WARFARIN SODIUM 1 MG/1
1-2 TABLET ORAL DAILY
COMMUNITY

## 2022-01-01 RX ORDER — POTASSIUM CHLORIDE 750 MG/1
10 TABLET, EXTENDED RELEASE ORAL DAILY
Start: 2022-01-01

## 2022-01-01 RX ORDER — SIMVASTATIN 10 MG
40 TABLET ORAL DAILY
Status: DISCONTINUED | OUTPATIENT
Start: 2022-01-01 | End: 2022-01-01 | Stop reason: HOSPADM

## 2022-01-01 RX ORDER — FINASTERIDE 5 MG/1
5 TABLET, FILM COATED ORAL DAILY
COMMUNITY

## 2022-01-01 RX ORDER — POTASSIUM CHLORIDE 1500 MG/1
20 TABLET, EXTENDED RELEASE ORAL ONCE
Status: COMPLETED | OUTPATIENT
Start: 2022-01-01 | End: 2022-01-01

## 2022-01-01 RX ADMIN — FUROSEMIDE 20 MG: 20 TABLET ORAL at 07:47

## 2022-01-01 RX ADMIN — SENNOSIDES AND DOCUSATE SODIUM 2 TABLET: 50; 8.6 TABLET ORAL at 14:10

## 2022-01-01 RX ADMIN — FUROSEMIDE 20 MG: 20 TABLET ORAL at 21:31

## 2022-01-01 RX ADMIN — CEFDINIR 300 MG: 300 CAPSULE ORAL at 09:18

## 2022-01-01 RX ADMIN — CEFDINIR 300 MG: 300 CAPSULE ORAL at 07:56

## 2022-01-01 RX ADMIN — CEFTRIAXONE SODIUM 1 G: 1 INJECTION, POWDER, FOR SOLUTION INTRAMUSCULAR; INTRAVENOUS at 21:36

## 2022-01-01 RX ADMIN — Medication 81 MG: at 09:37

## 2022-01-01 RX ADMIN — FUROSEMIDE 20 MG: 20 TABLET ORAL at 20:32

## 2022-01-01 RX ADMIN — PERFLUTREN 2 ML: 6.52 INJECTION, SUSPENSION INTRAVENOUS at 14:45

## 2022-01-01 RX ADMIN — Medication 81 MG: at 07:58

## 2022-01-01 RX ADMIN — TAMSULOSIN HYDROCHLORIDE 0.4 MG: 0.4 CAPSULE ORAL at 09:18

## 2022-01-01 RX ADMIN — TAMSULOSIN HYDROCHLORIDE 0.4 MG: 0.4 CAPSULE ORAL at 07:46

## 2022-01-01 RX ADMIN — POTASSIUM CHLORIDE 10 MEQ: 750 TABLET, EXTENDED RELEASE ORAL at 08:00

## 2022-01-01 RX ADMIN — PSYLLIUM HUSK 1 PACKET: 3.4 POWDER ORAL at 08:19

## 2022-01-01 RX ADMIN — FOLIC ACID 1 MG: 1 TABLET ORAL at 09:18

## 2022-01-01 RX ADMIN — FUROSEMIDE 20 MG: 20 TABLET ORAL at 09:38

## 2022-01-01 RX ADMIN — TAMSULOSIN HYDROCHLORIDE 0.4 MG: 0.4 CAPSULE ORAL at 07:59

## 2022-01-01 RX ADMIN — THIAMINE HCL TAB 100 MG 100 MG: 100 TAB at 07:59

## 2022-01-01 RX ADMIN — METOPROLOL TARTRATE 150 MG: 100 TABLET, FILM COATED ORAL at 07:46

## 2022-01-01 RX ADMIN — TAMSULOSIN HYDROCHLORIDE 0.4 MG: 0.4 CAPSULE ORAL at 10:02

## 2022-01-01 RX ADMIN — POTASSIUM CHLORIDE 10 MEQ: 750 TABLET, EXTENDED RELEASE ORAL at 09:17

## 2022-01-01 RX ADMIN — PSYLLIUM HUSK 1 PACKET: 3.4 POWDER ORAL at 07:47

## 2022-01-01 RX ADMIN — FUROSEMIDE 20 MG: 20 TABLET ORAL at 10:59

## 2022-01-01 RX ADMIN — FINASTERIDE 5 MG: 5 TABLET, FILM COATED ORAL at 09:18

## 2022-01-01 RX ADMIN — FOLIC ACID 1 MG: 1 TABLET ORAL at 07:46

## 2022-01-01 RX ADMIN — Medication 1 CAPSULE: at 21:31

## 2022-01-01 RX ADMIN — SIMVASTATIN 40 MG: 40 TABLET, FILM COATED ORAL at 09:38

## 2022-01-01 RX ADMIN — MAGNESIUM SULFATE HEPTAHYDRATE 2 G: 40 INJECTION, SOLUTION INTRAVENOUS at 17:18

## 2022-01-01 RX ADMIN — SIMVASTATIN 40 MG: 40 TABLET, FILM COATED ORAL at 07:46

## 2022-01-01 RX ADMIN — POTASSIUM CHLORIDE 40 MEQ: 20 TABLET, EXTENDED RELEASE ORAL at 10:59

## 2022-01-01 RX ADMIN — FUROSEMIDE 20 MG: 20 TABLET ORAL at 08:21

## 2022-01-01 RX ADMIN — Medication 1 CAPSULE: at 07:58

## 2022-01-01 RX ADMIN — FOLIC ACID 1 MG: 1 TABLET ORAL at 09:38

## 2022-01-01 RX ADMIN — THIAMINE HCL TAB 100 MG 100 MG: 100 TAB at 08:20

## 2022-01-01 RX ADMIN — Medication 1 CAPSULE: at 08:21

## 2022-01-01 RX ADMIN — MAGNESIUM 64 MG (MAGNESIUM CHLORIDE) TABLET,DELAYED RELEASE 535 MG: at 12:34

## 2022-01-01 RX ADMIN — Medication 81 MG: at 07:46

## 2022-01-01 RX ADMIN — Medication 1 CAPSULE: at 09:54

## 2022-01-01 RX ADMIN — FOLIC ACID 1 MG: 1 TABLET ORAL at 07:57

## 2022-01-01 RX ADMIN — Medication 1 CAPSULE: at 20:32

## 2022-01-01 RX ADMIN — FINASTERIDE 5 MG: 5 TABLET, FILM COATED ORAL at 09:39

## 2022-01-01 RX ADMIN — MAGNESIUM 64 MG (MAGNESIUM CHLORIDE) TABLET,DELAYED RELEASE 535 MG: at 08:00

## 2022-01-01 RX ADMIN — METOPROLOL TARTRATE 150 MG: 100 TABLET, FILM COATED ORAL at 09:39

## 2022-01-01 RX ADMIN — Medication 81 MG: at 10:02

## 2022-01-01 RX ADMIN — FUROSEMIDE 20 MG: 20 TABLET ORAL at 07:58

## 2022-01-01 RX ADMIN — FUROSEMIDE 20 MG: 20 TABLET ORAL at 20:55

## 2022-01-01 RX ADMIN — SIMVASTATIN 40 MG: 40 TABLET, FILM COATED ORAL at 07:57

## 2022-01-01 RX ADMIN — CEFTRIAXONE SODIUM 1 G: 1 INJECTION, POWDER, FOR SOLUTION INTRAMUSCULAR; INTRAVENOUS at 22:59

## 2022-01-01 RX ADMIN — METOPROLOL TARTRATE 150 MG: 100 TABLET, FILM COATED ORAL at 08:18

## 2022-01-01 RX ADMIN — Medication 81 MG: at 09:17

## 2022-01-01 RX ADMIN — WARFARIN SODIUM 2.5 MG: 2.5 TABLET ORAL at 18:08

## 2022-01-01 RX ADMIN — POTASSIUM CHLORIDE 40 MEQ: 1.5 POWDER, FOR SOLUTION ORAL at 17:17

## 2022-01-01 RX ADMIN — FINASTERIDE 5 MG: 5 TABLET, FILM COATED ORAL at 07:47

## 2022-01-01 RX ADMIN — CEFDINIR 300 MG: 300 CAPSULE ORAL at 20:54

## 2022-01-01 RX ADMIN — SIMVASTATIN 40 MG: 40 TABLET, FILM COATED ORAL at 08:21

## 2022-01-01 RX ADMIN — POTASSIUM CHLORIDE 20 MEQ: 1500 TABLET, EXTENDED RELEASE ORAL at 20:35

## 2022-01-01 RX ADMIN — TAMSULOSIN HYDROCHLORIDE 0.4 MG: 0.4 CAPSULE ORAL at 09:39

## 2022-01-01 RX ADMIN — THIAMINE HCL TAB 100 MG 100 MG: 100 TAB at 09:37

## 2022-01-01 RX ADMIN — THIAMINE HCL TAB 100 MG 100 MG: 100 TAB at 07:46

## 2022-01-01 RX ADMIN — PSYLLIUM HUSK 1 PACKET: 3.4 POWDER ORAL at 07:58

## 2022-01-01 RX ADMIN — WARFARIN SODIUM 2.5 MG: 2.5 TABLET ORAL at 17:18

## 2022-01-01 RX ADMIN — PSYLLIUM HUSK 1 PACKET: 3.4 POWDER ORAL at 09:55

## 2022-01-01 RX ADMIN — SODIUM CHLORIDE 500 ML: 9 INJECTION, SOLUTION INTRAVENOUS at 19:57

## 2022-01-01 RX ADMIN — BISACODYL 10 MG: 10 SUPPOSITORY RECTAL at 14:28

## 2022-01-01 RX ADMIN — FINASTERIDE 5 MG: 5 TABLET, FILM COATED ORAL at 08:00

## 2022-01-01 RX ADMIN — SIMVASTATIN 40 MG: 40 TABLET, FILM COATED ORAL at 09:54

## 2022-01-01 RX ADMIN — Medication 1 CAPSULE: at 09:37

## 2022-01-01 RX ADMIN — FUROSEMIDE 20 MG: 20 TABLET ORAL at 20:54

## 2022-01-01 RX ADMIN — METOPROLOL TARTRATE 100 MG: 25 TABLET, FILM COATED ORAL at 09:17

## 2022-01-01 RX ADMIN — Medication 1 CAPSULE: at 20:55

## 2022-01-01 RX ADMIN — Medication 1 CAPSULE: at 07:47

## 2022-01-01 RX ADMIN — THIAMINE HCL TAB 100 MG 100 MG: 100 TAB at 09:18

## 2022-01-01 RX ADMIN — PSYLLIUM HUSK 1 PACKET: 3.4 POWDER ORAL at 09:47

## 2022-01-01 RX ADMIN — POTASSIUM CHLORIDE 10 MEQ: 750 TABLET, EXTENDED RELEASE ORAL at 12:34

## 2022-01-01 RX ADMIN — SIMVASTATIN 40 MG: 40 TABLET, FILM COATED ORAL at 09:18

## 2022-01-01 RX ADMIN — CEFDINIR 300 MG: 300 CAPSULE ORAL at 20:55

## 2022-01-01 RX ADMIN — Medication 1 CAPSULE: at 09:17

## 2022-01-01 RX ADMIN — MAGNESIUM 64 MG (MAGNESIUM CHLORIDE) TABLET,DELAYED RELEASE 535 MG: at 09:17

## 2022-01-01 RX ADMIN — CEFTRIAXONE SODIUM 1 G: 1 INJECTION, POWDER, FOR SOLUTION INTRAMUSCULAR; INTRAVENOUS at 21:31

## 2022-01-01 RX ADMIN — PSYLLIUM HUSK 1 PACKET: 3.4 POWDER ORAL at 09:16

## 2022-01-01 RX ADMIN — Medication 1 CAPSULE: at 19:14

## 2022-01-01 RX ADMIN — Medication 81 MG: at 08:21

## 2022-01-01 RX ADMIN — WARFARIN SODIUM 2 MG: 2 TABLET ORAL at 18:13

## 2022-01-01 RX ADMIN — FINASTERIDE 5 MG: 5 TABLET, FILM COATED ORAL at 08:18

## 2022-01-01 RX ADMIN — SENNOSIDES AND DOCUSATE SODIUM 2 TABLET: 50; 8.6 TABLET ORAL at 08:44

## 2022-01-01 RX ADMIN — FOLIC ACID 1 MG: 1 TABLET ORAL at 08:21

## 2022-01-01 RX ADMIN — Medication 1 CAPSULE: at 20:54

## 2022-01-01 RX ADMIN — FINASTERIDE 5 MG: 5 TABLET, FILM COATED ORAL at 09:55

## 2022-01-01 RX ADMIN — TAMSULOSIN HYDROCHLORIDE 0.4 MG: 0.4 CAPSULE ORAL at 08:21

## 2022-01-01 RX ADMIN — FUROSEMIDE 20 MG: 20 TABLET ORAL at 09:18

## 2022-01-01 RX ADMIN — METOPROLOL TARTRATE 150 MG: 100 TABLET, FILM COATED ORAL at 09:54

## 2022-01-01 RX ADMIN — CEFDINIR 300 MG: 300 CAPSULE ORAL at 12:34

## 2022-01-01 ASSESSMENT — ENCOUNTER SYMPTOMS
CHILLS: 0
SHORTNESS OF BREATH: 0
ABDOMINAL PAIN: 0
HEADACHES: 0
BACK PAIN: 0
FEVER: 0
BACK PAIN: 0
ARTHRALGIAS: 1
LIGHT-HEADEDNESS: 0
SHORTNESS OF BREATH: 0
VOMITING: 0
NAUSEA: 0
COUGH: 0
DIZZINESS: 0
WEAKNESS: 0
NUMBNESS: 0
WEAKNESS: 0
COUGH: 0
DIARRHEA: 0
FEVER: 0
DIZZINESS: 0
NECK PAIN: 0
NECK PAIN: 0

## 2022-01-01 ASSESSMENT — ACTIVITIES OF DAILY LIVING (ADL)
ADLS_ACUITY_SCORE: 37
ADLS_ACUITY_SCORE: 49
ADLS_ACUITY_SCORE: 50
ADLS_ACUITY_SCORE: 49
ADLS_ACUITY_SCORE: 51
ADLS_ACUITY_SCORE: 50
ADLS_ACUITY_SCORE: 45
ADLS_ACUITY_SCORE: 38
ADLS_ACUITY_SCORE: 36
ADLS_ACUITY_SCORE: 50
ADLS_ACUITY_SCORE: 49
ADLS_ACUITY_SCORE: 36
ADLS_ACUITY_SCORE: 50
WALKING_OR_CLIMBING_STAIRS_DIFFICULTY: YES
DRESSING/BATHING: BATHING DIFFICULTY, ASSISTANCE 1 PERSON
ADLS_ACUITY_SCORE: 51
ADLS_ACUITY_SCORE: 50
ADLS_ACUITY_SCORE: 36
ADLS_ACUITY_SCORE: 48
TOILETING: 1-->ASSISTANCE (EQUIPMENT/PERSON) NEEDED (NOT DEVELOPMENTALLY APPROPRIATE)
ADLS_ACUITY_SCORE: 45
ADLS_ACUITY_SCORE: 39
ADLS_ACUITY_SCORE: 49
ADLS_ACUITY_SCORE: 51
ADLS_ACUITY_SCORE: 50
CONCENTRATING,_REMEMBERING_OR_MAKING_DECISIONS_DIFFICULTY: NO
ADLS_ACUITY_SCORE: 45
DIFFICULTY_EATING/SWALLOWING: NO
ADLS_ACUITY_SCORE: 49
ADLS_ACUITY_SCORE: 36
ADLS_ACUITY_SCORE: 50
ADLS_ACUITY_SCORE: 45
ADLS_ACUITY_SCORE: 49
ADLS_ACUITY_SCORE: 50
EQUIPMENT_CURRENTLY_USED_AT_HOME: WALKER, ROLLING
ADLS_ACUITY_SCORE: 50
ADLS_ACUITY_SCORE: 50
ADLS_ACUITY_SCORE: 35
TOILETING: 1-->ASSISTANCE (EQUIPMENT/PERSON) NEEDED
TOILETING_ASSISTANCE: TOILETING DIFFICULTY, ASSISTANCE 1 PERSON
ADLS_ACUITY_SCORE: 37
ADLS_ACUITY_SCORE: 36
ADLS_ACUITY_SCORE: 49
ADLS_ACUITY_SCORE: 45
ADLS_ACUITY_SCORE: 36
ADLS_ACUITY_SCORE: 45
ADLS_ACUITY_SCORE: 49
ADLS_ACUITY_SCORE: 45
ADLS_ACUITY_SCORE: 36
ADLS_ACUITY_SCORE: 37
ADLS_ACUITY_SCORE: 51
ADLS_ACUITY_SCORE: 39
ADLS_ACUITY_SCORE: 45
NUMBER_OF_TIMES_PATIENT_HAS_FALLEN_WITHIN_LAST_SIX_MONTHS: 1
ADLS_ACUITY_SCORE: 50
TRANSFERRING: 1-->ASSISTANCE (EQUIPMENT/PERSON) NEEDED
ADLS_ACUITY_SCORE: 51
ADLS_ACUITY_SCORE: 35
ADLS_ACUITY_SCORE: 39
ADLS_ACUITY_SCORE: 51
ADLS_ACUITY_SCORE: 51
ADLS_ACUITY_SCORE: 45
ADLS_ACUITY_SCORE: 45
ADLS_ACUITY_SCORE: 49
DRESSING/BATHING_DIFFICULTY: YES
WEAR_GLASSES_OR_BLIND: NO
ADLS_ACUITY_SCORE: 51
ADLS_ACUITY_SCORE: 50
TOILETING_ISSUES: YES
ADLS_ACUITY_SCORE: 48
ADLS_ACUITY_SCORE: 49
ADLS_ACUITY_SCORE: 51
WALKING_OR_CLIMBING_STAIRS: AMBULATION DIFFICULTY, DEPENDENT
CHANGE_IN_FUNCTIONAL_STATUS_SINCE_ONSET_OF_CURRENT_ILLNESS/INJURY: YES
ADLS_ACUITY_SCORE: 51
ADLS_ACUITY_SCORE: 50
ADLS_ACUITY_SCORE: 37
ADLS_ACUITY_SCORE: 50
ADLS_ACUITY_SCORE: 48
ADLS_ACUITY_SCORE: 50
DRESS: 1-->ASSISTANCE (EQUIPMENT/PERSON) NEEDED (NOT DEVELOPMENTALLY APPROPRIATE)
FALL_HISTORY_WITHIN_LAST_SIX_MONTHS: YES
DOING_ERRANDS_INDEPENDENTLY_DIFFICULTY: YES
DRESS: 1-->ASSISTANCE (EQUIPMENT/PERSON) NEEDED
ADLS_ACUITY_SCORE: 38
TRANSFERRING: 1-->ASSISTANCE (EQUIPMENT/PERSON) NEEDED (NOT DEVELOPMENTALLY APPROPRIATE)
BATHING: 1-->ASSISTANCE NEEDED
ADLS_ACUITY_SCORE: 49
ADLS_ACUITY_SCORE: 51
ADLS_ACUITY_SCORE: 50

## 2022-01-12 ENCOUNTER — LAB REQUISITION (OUTPATIENT)
Dept: LAB | Facility: CLINIC | Age: 87
End: 2022-01-12
Payer: MEDICARE

## 2022-01-12 DIAGNOSIS — I48.20 CHRONIC ATRIAL FIBRILLATION, UNSPECIFIED (H): ICD-10-CM

## 2022-01-13 LAB
ALBUMIN SERPL-MCNC: 3.3 G/DL (ref 3.5–5)
ALP SERPL-CCNC: 99 U/L (ref 45–120)
ALT SERPL W P-5'-P-CCNC: 10 U/L (ref 0–45)
ANION GAP SERPL CALCULATED.3IONS-SCNC: 9 MMOL/L (ref 5–18)
AST SERPL W P-5'-P-CCNC: 21 U/L (ref 0–40)
BILIRUB DIRECT SERPL-MCNC: 0.3 MG/DL
BILIRUB SERPL-MCNC: 0.8 MG/DL (ref 0–1)
BUN SERPL-MCNC: 28 MG/DL (ref 8–28)
CALCIUM SERPL-MCNC: 9.2 MG/DL (ref 8.5–10.5)
CHLORIDE BLD-SCNC: 106 MMOL/L (ref 98–107)
CO2 SERPL-SCNC: 24 MMOL/L (ref 22–31)
CREAT SERPL-MCNC: 1.07 MG/DL (ref 0.7–1.3)
ERYTHROCYTE [DISTWIDTH] IN BLOOD BY AUTOMATED COUNT: 16.5 % (ref 10–15)
GFR SERPL CREATININE-BSD FRML MDRD: 67 ML/MIN/1.73M2
GLUCOSE BLD-MCNC: 84 MG/DL (ref 70–125)
HBA1C MFR BLD: 5.4 %
HCT VFR BLD AUTO: 38.2 % (ref 40–53)
HGB BLD-MCNC: 12.2 G/DL (ref 13.3–17.7)
INR PPP: 2.52 (ref 0.85–1.15)
MCH RBC QN AUTO: 30.3 PG (ref 26.5–33)
MCHC RBC AUTO-ENTMCNC: 31.9 G/DL (ref 31.5–36.5)
MCV RBC AUTO: 95 FL (ref 78–100)
PLATELET # BLD AUTO: 208 10E3/UL (ref 150–450)
POTASSIUM BLD-SCNC: 4.9 MMOL/L (ref 3.5–5)
PROT SERPL-MCNC: 6.9 G/DL (ref 6–8)
RBC # BLD AUTO: 4.02 10E6/UL (ref 4.4–5.9)
SODIUM SERPL-SCNC: 139 MMOL/L (ref 136–145)
TSH SERPL DL<=0.005 MIU/L-ACNC: 2.83 UIU/ML (ref 0.3–5)
VIT B12 SERPL-MCNC: 190 PG/ML (ref 213–816)
WBC # BLD AUTO: 5.6 10E3/UL (ref 4–11)

## 2022-01-13 PROCEDURE — 83036 HEMOGLOBIN GLYCOSYLATED A1C: CPT | Mod: ORL | Performed by: NURSE PRACTITIONER

## 2022-01-13 PROCEDURE — 84443 ASSAY THYROID STIM HORMONE: CPT | Mod: ORL | Performed by: NURSE PRACTITIONER

## 2022-01-13 PROCEDURE — 80053 COMPREHEN METABOLIC PANEL: CPT | Mod: ORL | Performed by: NURSE PRACTITIONER

## 2022-01-13 PROCEDURE — 82248 BILIRUBIN DIRECT: CPT | Mod: ORL | Performed by: NURSE PRACTITIONER

## 2022-01-13 PROCEDURE — 85610 PROTHROMBIN TIME: CPT | Mod: ORL | Performed by: NURSE PRACTITIONER

## 2022-01-13 PROCEDURE — 85027 COMPLETE CBC AUTOMATED: CPT | Mod: ORL | Performed by: NURSE PRACTITIONER

## 2022-01-13 PROCEDURE — 36415 COLL VENOUS BLD VENIPUNCTURE: CPT | Mod: ORL | Performed by: NURSE PRACTITIONER

## 2022-01-13 PROCEDURE — 82607 VITAMIN B-12: CPT | Mod: ORL | Performed by: NURSE PRACTITIONER

## 2022-01-26 ENCOUNTER — LAB REQUISITION (OUTPATIENT)
Dept: LAB | Facility: CLINIC | Age: 87
End: 2022-01-26
Payer: MEDICARE

## 2022-01-26 DIAGNOSIS — I48.20 CHRONIC ATRIAL FIBRILLATION, UNSPECIFIED (H): ICD-10-CM

## 2022-01-27 LAB — INR PPP: 2.1 (ref 0.85–1.15)

## 2022-01-27 PROCEDURE — P9604 ONE-WAY ALLOW PRORATED TRIP: HCPCS | Mod: ORL | Performed by: NURSE PRACTITIONER

## 2022-01-27 PROCEDURE — 36415 COLL VENOUS BLD VENIPUNCTURE: CPT | Mod: ORL | Performed by: NURSE PRACTITIONER

## 2022-01-27 PROCEDURE — 85610 PROTHROMBIN TIME: CPT | Mod: ORL | Performed by: NURSE PRACTITIONER

## 2022-02-26 ENCOUNTER — LAB REQUISITION (OUTPATIENT)
Dept: LAB | Facility: CLINIC | Age: 87
End: 2022-02-26
Payer: MEDICARE

## 2022-02-26 DIAGNOSIS — I48.20 CHRONIC ATRIAL FIBRILLATION, UNSPECIFIED (H): ICD-10-CM

## 2022-02-28 LAB — INR PPP: 2.5 (ref 0.85–1.15)

## 2022-02-28 PROCEDURE — 85610 PROTHROMBIN TIME: CPT | Mod: ORL | Performed by: NURSE PRACTITIONER

## 2022-02-28 PROCEDURE — 36415 COLL VENOUS BLD VENIPUNCTURE: CPT | Mod: ORL | Performed by: NURSE PRACTITIONER

## 2022-02-28 PROCEDURE — P9604 ONE-WAY ALLOW PRORATED TRIP: HCPCS | Mod: ORL | Performed by: NURSE PRACTITIONER

## 2022-03-14 ENCOUNTER — LAB REQUISITION (OUTPATIENT)
Dept: LAB | Facility: CLINIC | Age: 87
End: 2022-03-14
Payer: MEDICARE

## 2022-03-14 DIAGNOSIS — I48.20 CHRONIC ATRIAL FIBRILLATION, UNSPECIFIED (H): ICD-10-CM

## 2022-03-14 LAB — INR PPP: 1.69 (ref 0.85–1.15)

## 2022-03-14 PROCEDURE — P9604 ONE-WAY ALLOW PRORATED TRIP: HCPCS | Mod: ORL | Performed by: NURSE PRACTITIONER

## 2022-03-14 PROCEDURE — 36415 COLL VENOUS BLD VENIPUNCTURE: CPT | Mod: ORL | Performed by: NURSE PRACTITIONER

## 2022-03-14 PROCEDURE — 85610 PROTHROMBIN TIME: CPT | Mod: ORL | Performed by: NURSE PRACTITIONER

## 2022-03-16 ENCOUNTER — LAB REQUISITION (OUTPATIENT)
Dept: LAB | Facility: CLINIC | Age: 87
End: 2022-03-16
Payer: MEDICARE

## 2022-03-16 DIAGNOSIS — I48.20 CHRONIC ATRIAL FIBRILLATION, UNSPECIFIED (H): ICD-10-CM

## 2022-03-17 LAB — INR PPP: 1.61 (ref 0.85–1.15)

## 2022-03-17 PROCEDURE — P9604 ONE-WAY ALLOW PRORATED TRIP: HCPCS | Mod: ORL | Performed by: NURSE PRACTITIONER

## 2022-03-17 PROCEDURE — 85610 PROTHROMBIN TIME: CPT | Mod: ORL | Performed by: NURSE PRACTITIONER

## 2022-03-17 PROCEDURE — 36415 COLL VENOUS BLD VENIPUNCTURE: CPT | Mod: ORL | Performed by: NURSE PRACTITIONER

## 2022-03-21 ENCOUNTER — LAB REQUISITION (OUTPATIENT)
Dept: LAB | Facility: CLINIC | Age: 87
End: 2022-03-21
Payer: MEDICARE

## 2022-03-21 DIAGNOSIS — I48.0 PAROXYSMAL ATRIAL FIBRILLATION (H): ICD-10-CM

## 2022-03-22 ENCOUNTER — LAB REQUISITION (OUTPATIENT)
Dept: LAB | Facility: CLINIC | Age: 87
End: 2022-03-22
Payer: MEDICARE

## 2022-03-22 DIAGNOSIS — I48.20 CHRONIC ATRIAL FIBRILLATION, UNSPECIFIED (H): ICD-10-CM

## 2022-03-24 LAB — INR PPP: 2.06 (ref 0.85–1.15)

## 2022-03-24 PROCEDURE — 36415 COLL VENOUS BLD VENIPUNCTURE: CPT | Mod: ORL | Performed by: NURSE PRACTITIONER

## 2022-03-24 PROCEDURE — 85610 PROTHROMBIN TIME: CPT | Mod: ORL | Performed by: NURSE PRACTITIONER

## 2022-03-24 PROCEDURE — P9604 ONE-WAY ALLOW PRORATED TRIP: HCPCS | Mod: ORL | Performed by: NURSE PRACTITIONER

## 2022-03-30 ENCOUNTER — LAB REQUISITION (OUTPATIENT)
Dept: LAB | Facility: CLINIC | Age: 87
End: 2022-03-30
Payer: MEDICARE

## 2022-03-30 DIAGNOSIS — I48.20 CHRONIC ATRIAL FIBRILLATION, UNSPECIFIED (H): ICD-10-CM

## 2022-03-31 LAB — INR PPP: 5.75 (ref 0.85–1.15)

## 2022-03-31 PROCEDURE — 85610 PROTHROMBIN TIME: CPT | Mod: ORL | Performed by: NURSE PRACTITIONER

## 2022-03-31 PROCEDURE — 36415 COLL VENOUS BLD VENIPUNCTURE: CPT | Mod: ORL | Performed by: NURSE PRACTITIONER

## 2022-03-31 PROCEDURE — P9604 ONE-WAY ALLOW PRORATED TRIP: HCPCS | Mod: ORL | Performed by: NURSE PRACTITIONER

## 2022-04-01 ENCOUNTER — LAB REQUISITION (OUTPATIENT)
Dept: LAB | Facility: CLINIC | Age: 87
End: 2022-04-01
Payer: MEDICARE

## 2022-04-01 DIAGNOSIS — I48.20 CHRONIC ATRIAL FIBRILLATION, UNSPECIFIED (H): ICD-10-CM

## 2022-04-04 LAB — INR PPP: 2.65 (ref 0.85–1.15)

## 2022-04-04 PROCEDURE — P9604 ONE-WAY ALLOW PRORATED TRIP: HCPCS | Mod: ORL | Performed by: NURSE PRACTITIONER

## 2022-04-04 PROCEDURE — 85610 PROTHROMBIN TIME: CPT | Mod: ORL | Performed by: NURSE PRACTITIONER

## 2022-04-04 PROCEDURE — 36415 COLL VENOUS BLD VENIPUNCTURE: CPT | Mod: ORL | Performed by: NURSE PRACTITIONER

## 2022-04-10 ENCOUNTER — LAB REQUISITION (OUTPATIENT)
Dept: LAB | Facility: CLINIC | Age: 87
End: 2022-04-10
Payer: MEDICARE

## 2022-04-10 DIAGNOSIS — I48.20 CHRONIC ATRIAL FIBRILLATION, UNSPECIFIED (H): ICD-10-CM

## 2022-04-11 LAB — INR PPP: 2.39 (ref 0.85–1.15)

## 2022-04-11 PROCEDURE — P9604 ONE-WAY ALLOW PRORATED TRIP: HCPCS | Mod: ORL | Performed by: NURSE PRACTITIONER

## 2022-04-11 PROCEDURE — 85610 PROTHROMBIN TIME: CPT | Mod: ORL | Performed by: NURSE PRACTITIONER

## 2022-04-11 PROCEDURE — 36415 COLL VENOUS BLD VENIPUNCTURE: CPT | Mod: ORL | Performed by: NURSE PRACTITIONER

## 2022-04-21 ENCOUNTER — LAB REQUISITION (OUTPATIENT)
Dept: LAB | Facility: CLINIC | Age: 87
End: 2022-04-21
Payer: MEDICARE

## 2022-04-21 DIAGNOSIS — I48.20 CHRONIC ATRIAL FIBRILLATION, UNSPECIFIED (H): ICD-10-CM

## 2022-04-25 LAB — INR PPP: 3.16 (ref 0.85–1.15)

## 2022-04-25 PROCEDURE — 85610 PROTHROMBIN TIME: CPT | Mod: ORL | Performed by: NURSE PRACTITIONER

## 2022-04-25 PROCEDURE — 36415 COLL VENOUS BLD VENIPUNCTURE: CPT | Mod: ORL | Performed by: NURSE PRACTITIONER

## 2022-04-25 PROCEDURE — P9604 ONE-WAY ALLOW PRORATED TRIP: HCPCS | Mod: ORL | Performed by: NURSE PRACTITIONER

## 2022-04-29 ENCOUNTER — LAB REQUISITION (OUTPATIENT)
Dept: LAB | Facility: CLINIC | Age: 87
End: 2022-04-29
Payer: MEDICARE

## 2022-04-29 DIAGNOSIS — I48.0 PAROXYSMAL ATRIAL FIBRILLATION (H): ICD-10-CM

## 2022-05-02 LAB — INR PPP: 1.9 (ref 0.85–1.15)

## 2022-05-02 PROCEDURE — P9604 ONE-WAY ALLOW PRORATED TRIP: HCPCS | Mod: ORL | Performed by: NURSE PRACTITIONER

## 2022-05-02 PROCEDURE — 36415 COLL VENOUS BLD VENIPUNCTURE: CPT | Mod: ORL | Performed by: NURSE PRACTITIONER

## 2022-05-02 PROCEDURE — 85610 PROTHROMBIN TIME: CPT | Mod: ORL | Performed by: NURSE PRACTITIONER

## 2022-11-03 PROBLEM — N18.30 STAGE 3 CHRONIC KIDNEY DISEASE (H): Status: ACTIVE | Noted: 2022-01-11

## 2022-11-03 NOTE — ED NOTES
ED Triage Provider Note  [facility]  Encounter Date: Nov 3, 2022    History:  No chief complaint on file.    Harish Kirkland is a 89 year old male who presents to the ED with left leg pain. Fell out of bed this morning. Denies hitting his head. Has left leg pain. Is on blood thinners. No fevers, cough. No lightheadedness, dizziness but not sure why fell. No chest pain.     Review of Systems:  Review of Systems   Constitutional: Negative for fever.   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  General: No acute distress. Appears stated age.   Cardio: Regular rate, extremities well perfused  Resp: Normal work of breathing, grossly normal respiratory rate  Neuro: Alert. CN II-XII grossly intact. Grossly intact strength.   Skin: warm, dry  MSK: tenderness to the left knee, some to the left ankle. Some mild swelling to knee, left knee grossly stable. No C, T or L spine tenderness or step offs. Has wound to the left 1st toe from prior injury.    Medical Decision Making:  Patient arriving to the ED with problem as above. A medical screening exam was performed. Here with left leg pain after fall. Says he didn't hit his head but doesn't remember why he fell, given this will get CT head and C spine. No spinal tenderness. Xrays of the left leg ordered. Ok to wait for room.    Interventions:  Orders Placed This Encounter     XR Knee Left 3 Views     XR Tibia and Fibula Left 2 Views     XR Ankle Left G/E 3 Views     Head CT w/o contrast     Cervical spine CT w/o contrast     Diagnosis:  Left leg pain         Emili Anderson MD  11/03/22 1124

## 2022-11-03 NOTE — ED TRIAGE NOTES
Pt with c/o left knee pain s/p a fall this am.  Pt was found sitting on the couch vs his usual chair by the nurse who checks on him at the AL.  Pt doesn't know why he fell, denies dizziness, cp and or sob.

## 2022-11-03 NOTE — ED PROVIDER NOTES
"EMERGENCY DEPARTMENT ENCOUNTER      NAME: Harish Kirkland  AGE: 89 year old male  YOB: 1933  MRN: [id number]  EVALUATION DATE & TIME: No admission date for patient encounter.    PCP: Tre Arias    ED PROVIDER: Makayla Stoddard PA-C      No chief complaint on file.    FINAL IMPRESSION:  1. Left knee pain    2. Fall, initial encounter        MEDICAL DECISION MAKING:    Pertinent Labs & Imaging studies reviewed. (See chart for details)  Harish Kirkland is a 89 year old male who presents for evaluation of a fall and left knee pain sustained morning of evaluation.  No head injury or loss of consciousness.  Was found by assisted living staff sitting in a chair different from one he normally sits in and that is when the patient admitted that he had fallen.  Was sent here for further evaluation..  Patient is adamant that he did not feel dizzy, heart palpitations or weakness prior to his fall reports he \"slid\" off the bed gently.    On my initial evaluation, vital signs normal. On physical exam patient is awake, alert, no acute distress, resting comfortably in chair.  No head injury or trauma or other skin abrasions noted.  Full range of motion of neck.  Heart sounds are normal, lungs are clear.  No abdominal tenderness on palpation.  No CT LS tenderness.  He has mild tenderness on palpation of his left knee over the patellar tendon.  Full range of motion of all joints, no laxity, negative varus or valgus stress test.  No pain on palpation of bilateral hips, ankles or other major joints.  No bleeding.  Full neurologic exam without focal deficit.  Cranial nerves III through XII intact.  Good strength and sensation bilaterally, normal pulses.     Differential diagnosis includes fracture, dislocation, contusion, tendon or ligamentous injury, subdural hematoma, spinal cord pathology. Emergency department workup included CT head and neck, x-ray left knee and left ankle.  As patient was on a blood " thinning medication, EKG, BMP, CBC and INR were also obtained.  Patient declined wanting any medication for pain.    CT head and neck negative for acute intracranial or spinal cord pathology.  X-ray of left knee and ankle negative for fracture, dislocation or other changes.  Suspect this could be muscular pain in nature.  Reassuring that his blood work-up also looked good today.  Discussion with patient and son and it sounds like patient had a mechanical fall rather than feeling dizzy or weak prior to his fall.  Did ask multiple times if patient felt comfortable going back home to his assisted living facility and patient and son are both in agreement safe there. Patient verbalized being ready to go home after I updated him on imaging results.  I do not appreciate any emergent process that would require patient to stay in the hospital.  Sounds like he also has good follow-up in his assisted living facility with nursing cares and primary care visits.  Did provide strict return precautions for any worsening symptoms.    Patient has had serial examinations and notes significant improvement.     Patient was discharged stable condition with treatment plan as below. Instructed to follow up with primary care provider in 3 days and return to the emergency department with any new or worsening of symptoms. Patient expressed understanding, feels comfortable, and is in agreement with this plan. All questions addressed prior to discharge.    Medical Decision Making    Supplemental history from: Family Member    External Record(s) Reviewed: N/A    Differential Diagnosis: See MDM charting for differential considered.     I performed an independent interpretation of the: EKG: See my documentation.    Discussed with radiology regarding test interpretation: N/A    Discussion of management with another provider: See ED Course    The following testing was considered but ultimately not selected: None     I considered prescription  management with: Symptomatic Management    The patient's care impacted: None    Consideration of Admission/Observation: N/A - Patient discharged without consideration for admission    Care significantly affected by Social Determinants of Health including: N/A    ED COURSE:  3:10 PM I reviewed the patient's chart. I met with the patient to gather history and to perform my initial exam. I updated patient on results and he feels safe going home.   I wore appropriate PPE during this encounter including: facemask & eye protection    We discussed plan for discharge including treatment plan, follow-up and return precautions to emergency department.  Patient voiced understanding and in agreement with this plan.    At the conclusion of the encounter I discussed the results of all of the tests and the disposition. The questions were answered. The patient or family acknowledged understanding and was agreeable with the care plan.     MEDICATIONS GIVEN IN THE EMERGENCY:  Medications - No data to display    NEW PRESCRIPTIONS STARTED AT TODAY'S ER VISIT  There are no discharge medications for this patient.           =================================================================    HPI:    Patient information was obtained from: Patient    Use of Interpretor: N/A        Harish Kirkland is a 89 year old male with a pertinent history of atrial fibrillation, CKD stage 3, hyperlipidemia, and hypertension who presents to this ED via private vehicle with son for evaluation of leg pain. Patient states that he fell out of bed this morning and was unable to get up so he crawled to the couch where his nurse found him. He notes that he is unable to recall why he fell out of bed but he believes that he may have hit his left leg on his walker as his endorses left knee pain. He has not taken any medications for pain and notes that pain has almost resolved. Denies hitting his head. Endorses chronic leg numbness. Denies chest pain,  "lightheadedness, dizziness, weakness, shortness of breath, back pain, neck pain, ankle pain, fever, and cough. Patient is on blood thinners (Warfarin). Patient notes that he lives in assisted living and that he \"loves\" it there. Patient feels safe returning home.      REVIEW OF SYSTEMS:  Review of Systems   Constitutional: Negative for fever.   Respiratory: Negative for cough and shortness of breath.    Cardiovascular: Negative for chest pain.   Musculoskeletal: Positive for arthralgias (left knee). Negative for back pain and neck pain.   Neurological: Negative for dizziness, weakness and light-headedness.       PAST MEDICAL HISTORY:  History reviewed. No pertinent past medical history.    PAST SURGICAL HISTORY:  History reviewed. No pertinent surgical history.    CURRENT MEDICATIONS:    No current facility-administered medications for this encounter.  No current outpatient medications on file.    ALLERGIES:  No Known Allergies    FAMILY HISTORY:  History reviewed. No pertinent family history.    SOCIAL HISTORY:   Social History     Socioeconomic History     Marital status:        VITALS:  Patient Vitals for the past 24 hrs:   BP Temp Pulse Resp SpO2   11/03/22 1605 109/55 -- 86 18 94 %   11/03/22 1119 101/71 98.3  F (36.8  C) 83 18 95 %       PHYSICAL EXAM    Constitutional: Well developed, Well nourished, NAD  HENT: Normocephalic, Atraumatic, Bilateral external ears normal, Oropharynx normal, mucous membranes moist, Nose normal.   Neck: Normal range of motion, No tenderness, Supple, No stridor.  Eyes: PERRL, EOMI, Conjunctiva normal, No discharge.   Respiratory: Normal breath sounds, No respiratory distress, No wheezing, Speaks full sentences easily. No cough.  Cardiovascular: Normal heart rate, Regular rhythm, No murmurs, No rubs, No gallops. Chest wall nontender.  GI: Soft, No tenderness, No masses, No flank tenderness. No rebound or guarding.  Musculoskeletal: 2+ DP pulses. No edema. No cyanosis, No " clubbing. Good range of motion in all major joints. mild tenderness on palpation of his left knee over the patellar tendon.  Full range of motion of all joints, no laxity, negative varus or valgus stress test.  No tenderness of the CTLS spine or other major joints.   Integument: Warm, Dry, No erythema, No rash. No petechiae. No skin abrasions or bleeding  Neurologic: Alert & oriented x 3, Normal motor function, Normal sensory function, No focal deficits noted. Normal gait.  Psychiatric: Affect normal, Judgment normal, Mood normal. Cooperative.    LAB:  All pertinent labs reviewed and interpreted.  Labs Ordered and Resulted from Time of ED Arrival to Time of ED Departure   CBC WITH PLATELETS - Abnormal       Result Value    WBC Count 7.9      RBC Count 4.30 (*)     Hemoglobin 12.1 (*)     Hematocrit 37.6 (*)     MCV 87      MCH 28.1      MCHC 32.2      RDW 17.2 (*)     Platelet Count 239     BASIC METABOLIC PANEL - Abnormal    Sodium 138      Potassium 4.1      Chloride 100      Carbon Dioxide (CO2) 25      Anion Gap 13      Urea Nitrogen 20.8      Creatinine 1.30 (*)     Calcium 9.3      Glucose 111 (*)     GFR Estimate 53 (*)    INR - Abnormal    INR 2.53 (*)        RADIOLOGY:  Reviewed all pertinent imaging. Please see official radiology report.  XR Ankle Left G/E 3 Views   Final Result   IMPRESSION: Osteopenia. No fracture. The ankle mortise is intact. Arterial calcifications.      XR Knee Left 1/2 Views   Final Result   IMPRESSION: Diffuse bone demineralization. Extensive arterial calcifications. Otherwise negative.      Cervical spine CT w/o contrast   Final Result   IMPRESSION:   HEAD CT:   1.  No CT evidence for acute intracranial process.   2.  Brain atrophy and presumed chronic microvascular ischemic changes as above.      CERVICAL SPINE CT:   1.  No CT evidence for acute fracture or post traumatic subluxation.   2.  Diffuse cervical spondylosis similar to findings on the previous MRI as detailed above.       Head CT w/o contrast   Final Result   IMPRESSION:   HEAD CT:   1.  No CT evidence for acute intracranial process.   2.  Brain atrophy and presumed chronic microvascular ischemic changes as above.      CERVICAL SPINE CT:   1.  No CT evidence for acute fracture or post traumatic subluxation.   2.  Diffuse cervical spondylosis similar to findings on the previous MRI as detailed above.          EKG:    Performed at: 1229  Rate: 70 bpm   Impression: atrial fibrillation    I have reviewed and interpreted the EKG(s) documented above along with Dr. Howell.    Diagnosis:  1. Left knee pain    2. Fall, initial encounter        ITeresa, am serving as a scribe to document services personally performed by Makayla Stoddard PA-C based on my observation and the provider's statements to me. I, Makayla Stoddard PA-C attest that Teresa Lopez is acting in a scribe capacity, has observed my performance of the services and has documented them in accordance with my direction.    Makayla Stoddard PA-C  Emergency Medicine  [facility]  11/3/2022     Makayla Stoddard PA-C  11/03/22 1749

## 2022-11-03 NOTE — DISCHARGE INSTRUCTIONS
Please bring this paperwork with you to your follow-up appointment.    You were seen in the urgent care/emergency department for left knee pain after a fall.     You had a an x-ray done, which did not show any fracture or dislocation to your knee or ankle.  You also had a CT scan done of your head and your neck, which is also not show any fractures or bleeds.    Suspect this could be a bad muscle strain or a contusion to your knee.  For this please ice your knee every 2-3 hours for 20 to 30 minutes.  Please also use a Ace wrap or heating pad to help with the pain.  You may also use over-the-counter Voltaren cream or IcyHot cream to help with the pain.    For your symptoms:    Tylenol/ibuprofen as needed  You may take up to 650 mg of Tylenol (acetaminophen) up to 4 times daily and up to 600 mg of ibuprofen up to 4 times daily as needed for fever, pain.  Please do not take more than the daily maximum recommended dose (tylenol = 4 grams, ibuprofen = 2.4 grams) as it can cause harm to your liver, kidneys, stomach.  It is best to take ibuprofen with food. Please read labels of any over-the-counter medicine you may be taking as it may contain Tylenol (acetaminophen) or Advil (ibuprofen).     Follow up with your primary care provider for recheck in 1 to 2 days for ER follow-up and repeat of INR.    Return to the emergency department if you develop worsening pain, lightheadedness, chest pain, headaches, dizziness, shortness of breath, or any other new worsening or concerning symptoms. We'd be happy to see you again.    Thank you for allowing us to be part of your care today.    Take care!  -Makayla Stoddard PA-C

## 2022-11-08 PROBLEM — I48.20 CHRONIC ATRIAL FIBRILLATION (H): Status: ACTIVE | Noted: 2022-01-01

## 2022-11-08 PROBLEM — R79.89 ELEVATED TROPONIN: Status: ACTIVE | Noted: 2022-01-01

## 2022-11-08 PROBLEM — M62.81 MUSCLE WEAKNESS (GENERALIZED): Status: ACTIVE | Noted: 2022-01-01

## 2022-11-08 PROBLEM — N18.30 CKD (CHRONIC KIDNEY DISEASE) STAGE 3, GFR 30-59 ML/MIN (H): Status: ACTIVE | Noted: 2022-01-01

## 2022-11-08 PROBLEM — R79.1 SUPRATHERAPEUTIC INR: Status: ACTIVE | Noted: 2022-01-01

## 2022-11-08 NOTE — ED PROVIDER NOTES
Emergency Department Encounter   NAME: Harish Kirkland ; AGE: 89 year old male ; YOB: 1933 ; MRN: [id number] ; PCP: Tre Arias   ED PROVIDER: Chari Grace PA-C    Evaluation Date & Time:   11/8/2022  4:01 PM    CHIEF COMPLAINT:  Generalized Weakness      Impression and Plan   MDM: Harish Kirkland is a 89 year old male with a pertinent history of carpal tunnel syndrome, chronic anticoagulation, spinal stenosis, atrial fibrillation, peripheral neuropathy, status post TAVR, who presents to the ED by EMS for evaluation of weakness.  Patient brought in by EMS over concerns for generalized weakness, fatigue, and refusing to get of bed at his assisted care facility since falling on Thursday (~ 5 days ago).  He was evaluated in our ED at that time and had unremarkable laboratory work-up, head and cervical spine CT, and x-rays of his left knee and ankle.  Here in the ER, he is alert, oriented with a GCS of 15.  He was initially quite upset about being evaluated in the emergency department though was agreeable to moving forward with exam and work-up.  He is neurologically intact, however he is anticoagulated on Coumadin, and given his recent head injury will obtain repeat head CT today to assess for delayed intracranial bleed.  Palpated the entirety of the spine without any reproducible tenderness or step-offs, although staff reported that patient was complaining of low back pain.  CT of lumbar spine ordered.  He does have tenderness over the left femoral head -CT of left hip ordered.  The remainder of his trauma exam is unremarkable.  Considered other sources of generalized weakness in an elderly male, and ordered EKG, blood work, and repeat urine.    CT of head negative for traumatic intracranial bleed.  CT of lumbar spine shows no evidence of compression fracture, traumatic subluxation, or severe spinal canal stenosis.  CT of the left hip shows no evidence of occult fracture.  EKG shows  atrial fibrillation that is rate controlled with a heart rate of 84 and left axis deviation and bundle branch block.  No changes from EKG on 11/3.  Troponin returned quite elevated at 123.  No evidence of ischemic changes on his EKG, and after reassessing the patient he denies any recent chest pain or shortness of breath and no active symptoms.  No indication at this time for heparin.  We will plan to admit to cardiac telemetry for continued monitoring and serial troponins.  His INR came back elevated around 5 today.  It was within normal range when he was seen on Thursday, I suspect changes likely due to his poor diet over the past several days.  He is not actively bleeding, no indication for reversal, hospitalist will likely hold Coumadin and continue to monitor.  Potassium of 3.2 -oral repletion ordered.  Kidney function is baseline with a creatinine of 1.28.  Chest x-ray is clear without signs of pneumonia.  UA is pending at time of admission. CT of hip did show incidental finding of likely developing decubitus ulcer to the left ischiorectal fossa with reactive sclerosis and hypertrophic changes to the left ischium.  Examined the patient - he has a superficial abrasion to left buttock. He has a very early appearing, likely stage 2, small ulceration to the left buttock near rectum. No muscle or deeper tissue involvement. No tenderness or purulent drainage to suggest infection. Low suspicion for osteomyelitis at this time based on exam.     ED COURSE:  5:07 PM I met and introduced myself to the patient. I gathered initial history and performed my physical exam. We discussed plan for initial workup.   5:30 PM Called [facility] - left  with RN line.   5:45 PM Called sonHerminio, listed as emergency contact - he reports that staff is concerned because since he fell on Thursday, he has not been getting out of bed, has refused to transfer to his wheelchair or go down to meals.   7:40 PM Received call from lab  in regards to critical troponin level.   7:42 PM I have staffed the patient with Dr. Lowery, ED MD, who has evaluated the patient and agrees with all aspects of today's care.   7:49 PM Patient updated. hospitalist paged.   8:12 PM I spoke with Dr. Jurado, hospitalist, who accepts the patient for cardiac telemetry admission.  8:15 PM Called Ray, son, updated him on plan for admission.   8:30 PM With RN, examined buttock area.     At the conclusion of the encounter I discussed the results of all the tests and the disposition. The questions were answered. The patient or family acknowledged understanding and was agreeable with the care plan.    FINAL IMPRESSION:    ICD-10-CM    1. Muscle weakness (generalized)  M62.81       2. Elevated troponin  R77.8       3. Supratherapeutic INR  R79.1       4. CKD (chronic kidney disease) stage 3, GFR 30-59 ml/min (H)  N18.30       5. Chronic atrial fibrillation (H)  I48.20             MEDICATIONS GIVEN IN THE EMERGENCY DEPARTMENT:  Medications   potassium chloride ER (KLOR-CON M) CR tablet 20 mEq (has no administration in time range)         NEW PRESCRIPTIONS STARTED AT TODAY'S ED VISIT:  New Prescriptions    No medications on file         HPI   Patient information was obtained from: Patient, EMS, and son    Use of Intrepreter: N/A    Harish Kirkland is a 89 year old male with a pertinent history of carpal tunnel syndrome, chronic anticoagulation, spinal stenosis, atrial fibrillation, peripheral neuropathy, status post TAVR, who presents to the ED by EMS for evaluation of weakness.     Per EMS, patient was picked up from [facility] after staff was concerned about him.  He fell on Thursday, and has had increased weakness, decreased mobility, and has been complaining of pain.    Per patient, he is not sure why he is here and is upset that he was brought to the hospital.  He states he feels fine.  He has been having some mild pain in his left hip though it is  "not hurting him currently.  He is nonambulatory and wheelchair-bound at baseline.  He has not had any headaches, visual or speech changes, arm or leg weakness or numbness, vomiting, chest pain, or difficulty breathing.    Per son, he spoke with staff today and patient has been refusing to get out of bed and has appeared generally weak.  He has been refusing transfer to get into his wheelchair and to go down for meals.  This is very unusual behavior and staff is concerned that there is something going on with him.  Patient is his own medical decision maker, does not have a healthcare agent.      REVIEW OF SYSTEMS:  Review of Systems   Constitutional: Negative for chills and fever.   Respiratory: Negative for cough and shortness of breath.    Cardiovascular: Negative for chest pain.   Gastrointestinal: Negative for abdominal pain, diarrhea, nausea and vomiting.   Genitourinary: Negative.    Musculoskeletal: Negative for back pain and neck pain.        Positive for left hip pain.    Neurological: Negative for dizziness, syncope, weakness, numbness and headaches.   All other systems reviewed and are negative.        Medical History     No past medical history on file.    No past surgical history on file.    No family history on file.         No current outpatient medications on file.        Physical Exam     First Vitals:  Patient Vitals for the past 24 hrs:   BP Temp Temp src Pulse Resp SpO2 Height Weight   11/08/22 1730 106/70 -- -- 94 25 95 % -- --   11/08/22 1715 120/64 -- -- 80 22 99 % -- --   11/08/22 1700 125/60 -- -- 78 28 98 % -- --   11/08/22 1645 110/56 -- -- 76 -- 99 % -- --   11/08/22 1630 106/54 -- -- 74 25 97 % -- --   11/08/22 1615 104/56 -- -- 73 27 94 % -- --   11/08/22 1603 111/64 97.5  F (36.4  C) Oral 78 20 97 % 1.88 m (6' 2\") 73.5 kg (162 lb)         PHYSICAL EXAM:   Physical Exam  Vitals and nursing note reviewed.   Constitutional:       General: He is not in acute distress.     Appearance: Normal " appearance. He is not ill-appearing or toxic-appearing.   HENT:      Head: Normocephalic and atraumatic.      Right Ear: Tympanic membrane normal.      Left Ear: Tympanic membrane normal.      Mouth/Throat:      Pharynx: Oropharynx is clear.   Eyes:      Extraocular Movements: Extraocular movements intact.      Conjunctiva/sclera: Conjunctivae normal.      Pupils: Pupils are equal, round, and reactive to light.   Neck:      Comments: No midline cervical spinal tenderness or palpable bony step-offs.  Forage motion of his neck.  Cardiovascular:      Rate and Rhythm: Normal rate and regular rhythm.      Heart sounds: Normal heart sounds.   Pulmonary:      Effort: Pulmonary effort is normal.      Breath sounds: Normal breath sounds.   Chest:      Chest wall: No tenderness.   Abdominal:      General: Abdomen is flat. There is no distension.      Palpations: Abdomen is soft.      Tenderness: There is no abdominal tenderness. There is no right CVA tenderness, left CVA tenderness, guarding or rebound.   Musculoskeletal:      Cervical back: Normal range of motion and neck supple.      Comments: Palpated the entirety of his spine without any reproducible tenderness or bony step-offs.  No bruising or skin changes to the back.  He does have tenderness to palpation over his left femoral head though is able to actively lift at the hip on his left side.  Unable to dorsiflex or plantarflex his bilateral feet which is baseline per patient though 5 out of 5 strength at hip joint.   Neurological:      Mental Status: He is alert and oriented to person, place, and time. Mental status is at baseline.      GCS: GCS eye subscore is 4. GCS verbal subscore is 5. GCS motor subscore is 6.      Comments: Cranial nerves III through XII intact.  Answering questions appropriately with normal speech.  Following commands.  Negative pronator drift.  5/5 strength with , flexion and extension at elbow joint, flexion at hip joint.  Sensation to  face, arms, and legs intact to light touch.         Results     LAB:  All pertinent labs reviewed and interpreted  Labs Ordered and Resulted from Time of ED Arrival to Time of ED Departure   CBC WITH PLATELETS - Abnormal       Result Value    WBC Count 7.8      RBC Count 4.01 (*)     Hemoglobin 11.3 (*)     Hematocrit 34.7 (*)     MCV 87      MCH 28.2      MCHC 32.6      RDW 17.1 (*)     Platelet Count 220     BASIC METABOLIC PANEL - Abnormal    Sodium 140      Potassium 3.2 (*)     Chloride 103      Carbon Dioxide (CO2) 23      Anion Gap 14      Urea Nitrogen 29.5 (*)     Creatinine 1.28 (*)     Calcium 8.7 (*)     Glucose 110 (*)     GFR Estimate 53 (*)    INR - Abnormal    INR 5.28 (*)    TROPONIN T, HIGH SENSITIVITY - Abnormal    Troponin T, High Sensitivity 123 (*)    UA MACROSCOPIC WITH REFLEX TO MICRO AND CULTURE       RADIOLOGY:  CT Hip Left w/o Contrast   Final Result   IMPRESSION:   1.  No fracture or joint malalignment.   2.  Bone demineralization.   3.  Adventitial bursa-probable developing decubitus ulcer in the left ischiorectal fossa. There is soft tissue in this region, extending to the left ischial tuberosity.   4.  Reactive sclerosis and hypertrophic changes in the left ischium, likely reactive to the above. No definitive findings of osteomyelitis (bone destruction or fragmentation). If this is on the differential, MRI is recommended in further evaluation.         Lumbar spine CT w/o contrast   Final Result   IMPRESSION:   1.  No acute fracture or posttraumatic subluxation.         XR Chest 1 View   Final Result   IMPRESSION: Heart size prominent on this AP view. Pulmonary vascularity normal. Subsegmental atelectasis or scarring left base, lungs otherwise clear.      Head CT w/o contrast   Final Result   IMPRESSION:   1.  No acute intracranial process.            ECG:    Performed at: 19:28:10    Impression: Atrial fibrillation. Left axis deviation. LBBB     Rate: 84 bpm  Rhythm: A. FIB.  Axis: *  -36 -49  AZ Interval: *  QRS Interval: 146  QTc Interval: 527  ST Changes: None   Comparison: 11/3/2022 - no significant change.     EKG results reviewed and interpreted by Dr. Lowery, ED MD.         Chari Grace PA-C   Emergency Medicine   [facility] EMERGENCY DEPARTMENT       Chari Grace PA-C  11/08/22 0149

## 2022-11-08 NOTE — ED NOTES
Bed: JNEDH-04  Expected date: 11/8/22  Expected time: 3:55 PM  Means of arrival: Ambulance  Comments:  WBL 89M- fall few days ago; increasing weakness

## 2022-11-08 NOTE — ED TRIAGE NOTES
Patient arrives via EMS from [facility]. Per medics, patient had a fall on Thursday, xray here revealed L hip fracture. Since returning to [facility], patient has had increased weakiness, decreased mobility, and low back pain. Staff wants further eval     Triage Assessment     Row Name 11/08/22 1606       Triage Assessment (Adult)    Airway WDL WDL       Respiratory WDL    Respiratory WDL WDL       Skin Circulation/Temperature WDL    Skin Circulation/Temperature WDL WDL       Cardiac WDL    Cardiac WDL WDL       Peripheral/Neurovascular WDL    Peripheral Neurovascular WDL WDL       Cognitive/Neuro/Behavioral WDL    Cognitive/Neuro/Behavioral WDL WDL

## 2022-11-09 NOTE — PLAN OF CARE
Goal Outcome Evaluation:      Plan of Care Reviewed With: patient    Overall Patient Progress: no changeOverall Patient Progress: no change    Outcome Evaluation: denies chest pain, heavy assist of 2 to transfer from bed to chair, at baseline patient does not walk.    Per Assisted living, Herminio has been laying in bed for the last week.  He has had incontinent stool which he is not aware of.

## 2022-11-09 NOTE — ED NOTES
"Emergency Department Midlevel Supervisory Note     I personally saw the patient and performed a substantive portion of the visit including all aspects of the medical decision making.    ED Course:  7:41 PM Perla Grace PA-C staffed patient with me. I agree with their assessment and plan of management, and I will see the patient.  7:43 PM I met with the patient to introduce myself, gather additional history, perform my initial exam, and discuss the plan.   8:26 PM troponin mildly elevated.  However patient without chest pain symptoms  Brief HPI:     Harish Kirkland is a 89 year old male who presents for evaluation of weakness.   Per EMS, patient was picked up from [facility] after staff was concerned about him.  He fell on Thursday, and has had increased weakness, decreased mobility, and has been complaining of pain.  Per patient, he is not sure why he is here and is upset that he was brought to the hospital.  He states he feels fine.    I, Brii Pittman, am serving as a scribe to document services personally performed by Alexx Lowery, based on my observations and the provider's statements to me.   I, Alexx Lowery attest that Brii Pittman was acting in a scribe capacity, has observed my performance of the services and has documented them in accordance with my direction.    Brief Physical Exam: /70   Pulse 94   Temp 97.5  F (36.4  C) (Oral)   Resp 25   Ht 1.88 m (6' 2\")   Wt 73.5 kg (162 lb)   SpO2 95%   BMI 20.80 kg/m    Constitutional:  Alert, in mild acute distress  EYES: Conjunctivae clear  HENT:  Atraumatic, normocephalic  Respiratory:  Respirations even, unlabored, in no acute respiratory distress  Cardiovascular:  Regular rate and rhythm, good peripheral perfusion  GI: Soft, nondistended, nontender, no palpable masses, no rebound, no guarding   Musculoskeletal:  No edema. No cyanosis. Range of motion major extremities intact.    Integument: Warm, Dry, No erythema, No rash. " "  Neurologic:  Alert & oriented, no focal deficits noted  Psych: Normal mood and affect  MDM:  Patient elderly male with reports of fall on Thursday which is mechanical.  Records indicate patient had extensive imaging none of which revealed any type of fractures.  Since then patient less active and not coming down for meals per individuals at his care center.  Patient denies any obvious symptoms.  Reports he is feeling fine and \"does not know why I am here\".  Patient seen by the PA.  Baseline laboratory evaluation unremarkable other than mild elevation of his troponin.  EKG without acute changes.  INR supratherapeutic at 5.29 making ischemic issues highly unlikely.  Plan will be for hospitalization with serial troponins.  Patient discussed with hospitalist and is agreeable.  CT imaging unremarkable.      Weakness  Elevated troponin    Labs and Imaging:  Results for orders placed or performed during the hospital encounter of 11/08/22   Head CT w/o contrast    Impression    IMPRESSION:  1.  No acute intracranial process.   CT Hip Left w/o Contrast    Impression    IMPRESSION:  1.  No fracture or joint malalignment.  2.  Bone demineralization.  3.  Adventitial bursa-probable developing decubitus ulcer in the left ischiorectal fossa. There is soft tissue in this region, extending to the left ischial tuberosity.  4.  Reactive sclerosis and hypertrophic changes in the left ischium, likely reactive to the above. No definitive findings of osteomyelitis (bone destruction or fragmentation). If this is on the differential, MRI is recommended in further evaluation.     Lumbar spine CT w/o contrast    Impression    IMPRESSION:  1.  No acute fracture or posttraumatic subluxation.     XR Chest 1 View    Impression    IMPRESSION: Heart size prominent on this AP view. Pulmonary vascularity normal. Subsegmental atelectasis or scarring left base, lungs otherwise clear.   CBC (+ platelets, no diff)   Result Value Ref Range    WBC Count " 7.8 4.0 - 11.0 10e3/uL    RBC Count 4.01 (L) 4.40 - 5.90 10e6/uL    Hemoglobin 11.3 (L) 13.3 - 17.7 g/dL    Hematocrit 34.7 (L) 40.0 - 53.0 %    MCV 87 78 - 100 fL    MCH 28.2 26.5 - 33.0 pg    MCHC 32.6 31.5 - 36.5 g/dL    RDW 17.1 (H) 10.0 - 15.0 %    Platelet Count 220 150 - 450 10e3/uL   Basic metabolic panel   Result Value Ref Range    Sodium 140 136 - 145 mmol/L    Potassium 3.2 (L) 3.4 - 5.3 mmol/L    Chloride 103 98 - 107 mmol/L    Carbon Dioxide (CO2) 23 22 - 29 mmol/L    Anion Gap 14 7 - 15 mmol/L    Urea Nitrogen 29.5 (H) 8.0 - 23.0 mg/dL    Creatinine 1.28 (H) 0.67 - 1.17 mg/dL    Calcium 8.7 (L) 8.8 - 10.2 mg/dL    Glucose 110 (H) 70 - 99 mg/dL    GFR Estimate 53 (L) >60 mL/min/1.73m2   Troponin T, High Sensitivity (now)   Result Value Ref Range    Troponin T, High Sensitivity 123 (HH) <=22 ng/L     I have reviewed the relevant laboratory and radiology studies    Procedures:  I was present for the key portions of this procedure: none    Alexx Lowery MD  [facility] EMERGENCY DEPARTMENT  [address] [place] [id number] [phone]       Alexx Lowery MD  11/08/22 6654

## 2022-11-09 NOTE — CONSULTS
[facility] Nurse Inpatient Assessment     Consulted for: L IT and coccyx present on admission    Patient History (according to provider note(s):      Harish Kirkland is a 89 year old male admitted on 11/8/2022. He was sent to the ED by ambulance from independent living facility for evaluation of increased weakness, decreased mobility and low back/left hip pain.    5.  Last ischial tuberosity stage II pressure ulcer, present on admission  CT scan showed evidence of ischial bursa-probable developing decubitus ulcer in the left ischiorectal fossa, there is soft tissue in this region, extending to the left ischial tuberosity.  Reactive sclerosis and hypertrophic changes in the left ischium likely reactive, no definite findings of osteomyelitis.  Wound care consult  Areas Assessed:      Areas visualized during today's visit: Focused: and sacrum/coccyx/ischial tuberosities     Wound Location:  Coccyx        Date of last photo 11/9  Wound History: chronic pressure ulcers stage 3, he reports he cannot feel his pelvis   Wound Base: 100 % dermis     Palpation of the wound bed: firm      Drainage: none     Description of drainage: none     Measurements (length x width x depth, in cm) 3.5  x 2.6  x  0.3 cm      Tunneling N/A     Undermining N/A  Periwound skin: hyperkeratosis, irritant dermatitis, superficial erosion and chronic tissue injury and caked on stool       Color: pink      Temperature: normal   Odor: none  Pain: denies , said that he does not have any feeling back there     Wound Location:  Left IT         Date of last photo 11/9  Wound History: see above wheel chair bound? Stage 3 pressure injury present on admission started as a dtpi or from trauma   Wound Base: 100% dermis with purple base      Palpation of the wound bed: boggy      Drainage: moderate     Description of drainage: tan     Measurements (length x width x depth, in cm) 2.8  x 3  x  0.2 cm      Tunneling N/A      Undermining N/A  Periwound skin: blister(s), edematous and nonblanchable erythema out to 1cm circumferentially       Color: pink      Temperature: normal   Odor: none  Pain: no grimacing or signs of discomfort, did not complain when I removed the dressing       Treatment Plan:       Left IT daily   Cleanse wound and periwound skin with vashe #[id number]  Apply silvercel cut to fit the wound bed (do not fold it)  Apply 4x4 mepilex foam    Coccyx every other day   Cleanse wound and periwound skin with vashe #[id number]  Apply iodosorb gel to oil emulsion gauze with tongue depressor   Apply oil emulsion gauze gel side down on wound  Apply sacral mepilex     -Mattress: low air loss  -HOB: Maintain at or below 30 degrees, unless contraindicated  -Repositioning in bed: Every 1-2 hours , Left/right positioning; avoid supine, Raise foot of bed prior to raising head of bed, to reduce patient sliding down (shear) and Frequent microturns using TAPS wedges, as patient tolerates  -Heels: Keep elevated off mattress and Pillows under calves  -Protective Dressing: Sacral Mepilex for prevention (#[id number]),  especially for the agitated patient   -Positioning Equipment: TAPS wedges (#[id number]) to help maintain 30 degree side lying position as needed  -Chair positioning: Chair cushion (#[id number])  and Assist patient to reposition hourly   -Moisture Management: Perineal cleansing /protection: Follow Incontinence Protocol, Avoid brief in bed and Clean and dry skin folds with bathing   -Under Devices: Inspect skin under all medical devices during skin inspection , Ensure tubes are stabilized without tension and Ensure patient is not lying on medical devices or equipment when repositioned    Orders: Written    RECOMMEND PRIMARY TEAM ORDER: pulsate mattress   Education provided: plan of care  Discussed plan of care with: Nurse  WOC nurse follow-up plan: weekly  Notify WOC if wound(s) deteriorate.  Nursing to notify the Provider(s) and re-consult the WOC  Nurse if new skin concern.    DATA:     Current support surface: Standard  Atmos Air mattress  Containment of urine/stool: Incontinence Protocol  BMI: Body mass index is 20.8 kg/m .   Active diet order: Orders Placed This Encounter      Combination Diet Low Saturated Fat Na <2400mg Diet, No Caffeine Diet     Output: I/O last 3 completed shifts:  In: 10 [IV Piggyback:10]  Out: 1050 [Urine:1050]     Labs: Recent Labs   Lab 11/08/22  1858   HGB 11.3*   INR 5.28*   WBC 7.8     Pressure injury risk assessment:   Sensory Perception: 3-->slightly limited  Moisture: 4-->rarely moist  Activity: 2-->chairfast  Mobility: 2-->very limited  Nutrition: 3-->adequate  Friction and Shear: 2-->potential problem  Timothy Score: 16    Lanette Carrillo, RN BS CWOCN

## 2022-11-09 NOTE — PHARMACY-ADMISSION MEDICATION HISTORY
Pharmacy Note - Admission Medication History    Pertinent Provider Information: Utilized med list sent from facility which states that pt's family sets up meds. Attempted to contact son (Grant [phone]) who sets up pt's meds but no answer. Unsure when pt took medications as no MAR exists.   ______________________________________________________________________    Prior To Admission (PTA) med list completed and updated in EMR.       PTA Med List   Medication Sig Last Dose     aspirin 81 MG EC tablet Take 81 mg by mouth daily Unknown at am     finasteride (PROSCAR) 5 MG tablet Take 5 mg by mouth daily Unknown at pm     furosemide (LASIX) 20 MG tablet Take 20 mg by mouth 2 times daily Unknown     metoprolol tartrate (LOPRESSOR) 100 MG tablet Take 150 mg by mouth daily Unknown at pm     Multiple Vitamins-Minerals (PRESERVISION AREDS) TABS Take 2 tablets by mouth 2 times daily Unknown     psyllium (METAMUCIL/KONSYL) Packet Take 1 packet by mouth daily Unknown     simvastatin (ZOCOR) 40 MG tablet Take 40 mg by mouth daily Unknown at pm     tamsulosin (FLOMAX) 0.4 MG capsule Take 0.4 mg by mouth daily Unknown at am     warfarin ANTICOAGULANT (COUMADIN) 1 MG tablet Take 1-2 mg by mouth daily 2mg MWF; 1mg all other days Unknown       Information source(s): Facility ([facility] medication list/MAR  Method of interview communication: N/A    Summary of Changes to PTA Med List  New: all  Discontinued: none  Changed: none    Patient was asked about OTC/herbal products specifically.  PTA med list reflects this.    In the past week, patient estimated taking medication this percent of the time:  greater than 90%.    Allergies were reviewed, assessed, and updated with the patient.      Patient does not use any multi-dose medications prior to admission.    The information provided in this note is only as accurate as the sources available at the time of the update(s).    Thank you for the opportunity to participate in the care of  this patient.    Rosa M Webster, PharmD  11/8/2022 9:33 PM

## 2022-11-09 NOTE — PROGRESS NOTES
[facility]    Medicine Progress Note - Hospitalist Service    Date of Admission:  11/8/2022    Assessment & Plan     Harish Kirkland is a 89 year old male admitted on 11/8/2022. He was sent to the ED by ambulance from independent living facility for evaluation of increased weakness, decreased mobility and low back/left hip pain.     1.  Elevated troponin, concern possible NSTEMI  -High-sensitivity troponin T 123 and 121  -Patient denies angina symptoms  -EKG showed nonspecific changes  -Telemetry monitoring  -Echocardiogram to evaluate structure and function  -Cardiology consult     2.  Generalized weakness  -Likely in the setting of recent fall, deconditioning and UTI  -Patient can transfer but spends most of the time on a wheelchair or recliner  -Supportive management  -Fall precautions  -PT/OT evaluation and treatment  - for disposition, may need TCU at discharge  -check tsh  -check b12  -check b1, daily etoh per son, added thiamine po     3.  Acute cystitis without hematuria  -Continue IV ceftriaxone  -Follow-up urine culture     4.  Chronic atrial fibrillation  -Rate is controlled, continue PTA metoprolol  -Supratherapeutic INR 5.28  -Hold warfarin, no need for reversal absent of bleeding, pharmacy to dose     5.  Last ischial tuberosity stage II pressure ulcer, present on admission  CT scan showed evidence of ischial bursa-probable developing decubitus ulcer in the left ischiorectal fossa, there is soft tissue in this region, extending to the left ischial tuberosity.  Reactive sclerosis and hypertrophic changes in the left ischium likely reactive, no definite findings of osteomyelitis.  -Wound care consult     6.  Hypokalemia  -Replace per protocol  -check mag     7.  Chronic kidney disease stage IIIa  -Stable renal function  -Monitor volume status  -Avoid nephrotoxins     8.  Chronic anemia  -Stable hemoglobin without signs of acute blood loss     9.   Hyperlipidemia  -Continue PTA simvastatin     10.  BPH, urinary retention  -Continue PTA finasteride, tamsulosin     11.Dispo-needs pt/ot eval          Diet: Combination Diet Low Saturated Fat Na <2400mg Diet, No Caffeine Diet    DVT Prophylaxis: Warfarin  Shepard Catheter: PRESENT, indication: Retention  Central Lines: None  Cardiac Monitoring: ACTIVE order. Indication: AMI (NSTEMI/ STEMI) (48 hours)  Code Status: Full Code      Disposition Plan      Expected Discharge Date: 11/10/2022                The patient's care was discussed with the Patient.  I called son Herminio to update at 1146    Radha Caba MD  Hospitalist Service  [facility]  Securely message with the Vocera Web Console (learn more here)  Text page via [employer] Paging/Directory         ______________________________________________________________________    Interval History   He denied cp or pain anywhere  He is not sure why he is here  He tells me he lives indepd living, but does get some assistance    Data reviewed today: I reviewed all medications, new labs and imaging results over the last 24 hours. I personally reviewed no images or EKG's today.    Physical Exam   Vital Signs: Temp: 98  F (36.7  C) Temp src: Oral BP: 131/65 Pulse: 88   Resp: 19 SpO2: 94 % O2 Device: None (Room air)    Weight: 162 lbs 0 oz  Constitutional: awake, fatigued, alert, cooperative and no apparent distress  Eyes: sclera clear  Respiratory: No increased work of breathing, good air exchange, clear to auscultation bilaterally, no crackles or wheezing  Cardiovascular: Normal apical impulse, regular rate and rhythm, normal S1 and S2, no S3 or S4, and no murmur noted  GI: normal bowel sounds, soft, non-distended and non-tender  Skin: no bruising or bleeding  Musculoskeletal: no lower extremity pitting edema present  Neurologic: Mental Status Exam:  Level of Alertness:   awake  Neuropsychiatric: General: normal, calm and normal eye contact    Data    Recent Labs   Lab 11/08/22  1858 11/03/22  1416   WBC 7.8 7.9   HGB 11.3* 12.1*   MCV 87 87    239   INR 5.28* 2.53*    138   POTASSIUM 3.2* 4.1   CHLORIDE 103 100   CO2 23 25   BUN 29.5* 20.8   CR 1.28* 1.30*   ANIONGAP 14 13   SHENG 8.7* 9.3   * 111*     Recent Results (from the past 24 hour(s))   Head CT w/o contrast    Narrative    EXAM: CT HEAD W/O CONTRAST  LOCATION: [facility]  DATE/TIME: 11/8/2022 6:36 PM    INDICATION: fall, head injury, anticoagulated  COMPARISON: CT head 11/03/2022  TECHNIQUE: Routine CT Head without IV contrast. Multiplanar reformats. Dose reduction techniques were used.    FINDINGS:  INTRACRANIAL CONTENTS: No intracranial hemorrhage, extraaxial collection, or mass effect.  No CT evidence of acute infarct. Mild presumed chronic small vessel ischemic changes. Focal small encephalomalacia left parieto-occipital region. Mild generalized   volume loss. No hydrocephalus.     VISUALIZED ORBITS/SINUSES/MASTOIDS: Prior left cataract surgery. Visualized portions of the orbits are otherwise unremarkable. Mild mucosal thickening primarily involving the left ethmoid air cells. Mild fluid/membrane thickening in the left and mastoid   air cells at the tip. No apparent mass in the posterior nasopharynx or skull base.    BONES/SOFT TISSUES: No acute abnormality.      Impression    IMPRESSION:  1.  No acute intracranial process.   XR Chest 1 View    Narrative    EXAM: XR CHEST 1 VIEW  LOCATION: [facility]  DATE/TIME: 11/8/2022 6:37 PM    INDICATION: weakness  COMPARISON: None.      Impression    IMPRESSION: Heart size prominent on this AP view. Pulmonary vascularity normal. Subsegmental atelectasis or scarring left base, lungs otherwise clear.   Lumbar spine CT w/o contrast    Narrative    EXAM: CT LUMBAR SPINE W/O CONTRAST  LOCATION: [facility]  DATE/TIME: 11/8/2022 6:37 PM    INDICATION: fall,  pain  COMPARISON: CT 08/07/2017  TECHNIQUE: Routine CT Lumbar Spine without IV contrast. Multiplanar reformats. Dose reduction techniques were used.     FINDINGS:  VERTEBRA: Normal vertebral body heights. No fracture or posttraumatic subluxation. Slight retrolisthesis L5-S1. Status post posterior instrumented fusion with decompression L1-L3. Hardware is intact. Osteopenic bones.    CANAL/FORAMINA: Advanced degenerative changes. No canal or neural foraminal stenosis.    PARASPINAL: No extraspinal abnormality.      Impression    IMPRESSION:  1.  No acute fracture or posttraumatic subluxation.     CT Hip Left w/o Contrast    Narrative    EXAM: CT HIP LEFT W/O CONTRAST  LOCATION: [facility]  DATE/TIME: 11/8/2022 6:38 PM    INDICATION: 89-year-old patient with left-sided hip pain after a fall.  COMPARISON: None.  TECHNIQUE: Noncontrast. Axial, sagittal and coronal thin-section reconstruction. Dose reduction techniques were used.     FINDINGS:     BONES AND JOINTS:  -Bone demineralization.  -No proximal femur fracture.  -Sclerotic and hypertrophic changes of the left ischium, with adjacent soft tissue heterotopic ossification.  Normal joint alignment.    SOFT TISSUES:  -Moderate fatty atrophy of the left gluteus minimus and medius muscles.  -Soft tissue thickening superficial to the left greater trochanter.  -Soft tissue thickening in the left ischiorectal subcutaneous tissues, extending to the left ischium.  -Small left bladder diverticulum.       Impression    IMPRESSION:  1.  No fracture or joint malalignment.  2.  Bone demineralization.  3.  Adventitial bursa-probable developing decubitus ulcer in the left ischiorectal fossa. There is soft tissue in this region, extending to the left ischial tuberosity.  4.  Reactive sclerosis and hypertrophic changes in the left ischium, likely reactive to the above. No definitive findings of osteomyelitis (bone destruction or fragmentation). If this is on  the differential, MRI is recommended in further evaluation.

## 2022-11-09 NOTE — UTILIZATION REVIEW
Admission Status; Secondary Review Determination   Under the authority of the Utilization Management Committee, the utilization review process indicated a secondary review on Harish Kirkland. The review outcome is based on review of the medical records, discussions with staff, and applying clinical experience noted on the date of the review.   (x) Inpatient Status Appropriate - This patient's medical care is consistent with medical management for inpatient care and reasonable inpatient medical practice.     RATIONALE FOR DETERMINATION   89 year old male with a past history of carpal tunnel syndrome, chronic anticoagulation, spinal stenosis, atrial fibrillation, peripheral neuropathy, status post TAVR, who presents to the ED by EMS for evaluation of weakness, admitted with NSTEMI and acute cystitis , on IV antibiotics , pending echo and cardiology consult     At the time of admission with the information available to the attending physician more than 2 nights Hospital complex care was anticipated, based on patient risk of adverse outcome if treated as outpatient and complex care required. Inpatient admission is appropriate based on the Medicare guidelines.   The information on this document is developed by the utilization review team in order for the business office to ensure compliance. This only denotes the appropriateness of proper admission status and does not reflect the quality of care rendered.   The definitions of Inpatient Status and Observation Status used in making the determination above are those provided in the CMS Coverage Manual, Chapter 1 and Chapter 6, section 70.4.   Sincerely,   Lazarus Wooten MD  Utilization Review  Physician Advisor  [facility]

## 2022-11-09 NOTE — H&P
[facility]    History and Physical - Hospitalist Service       Date of Admission:  11/8/2022    Assessment & Plan      Harish Kirkland is a 89 year old male admitted on 11/8/2022. He was sent to the ED by ambulance from independent living facility for evaluation of increased weakness, decreased mobility and low back/left hip pain.    1.  Elevated troponin  High-sensitivity troponin T 123 and 121  Patient denies angina symptoms  EKG showed nonspecific changes  Telemetry monitoring  Echocardiogram to evaluate structure and function  Cardiology consult    2.  Generalized weakness  Likely in the setting of recent fall, deconditioning and UTI  Patient can transfer but spends most of the time on a wheelchair or recliner  Supportive management  Fall precautions  PT/OT evaluation and treatment   for disposition, may need TCU at discharge    3.  Acute cystitis without hematuria  Continue IV ceftriaxone  Follow-up urine culture    4.  Chronic atrial fibrillation  Rate is controlled, continue PTA metoprolol  Supratherapeutic INR 5.28  Hold warfarin, no need for reversal absent of bleeding, pharmacy to dose    5.  Left ischial tuberosity stage II pressure ulcer, present on admission  CT scan showed evidence of ischial bursa-probable developing decubitus ulcer in the left ischiorectal fossa, there is soft tissue in this region, extending to the left ischial tuberosity.  Reactive sclerosis and hypertrophic changes in the left ischium likely reactive, no definite findings of osteomyelitis.  Wound care consult    6.  Hypokalemia  Replace per protocol    7.  Chronic kidney disease stage IIIa  Stable renal function  Monitor volume status  Avoid nephrotoxins    8.  Chronic anemia  Stable hemoglobin without signs of acute blood loss    9.  Hyperlipidemia  Continue PTA simvastatin    10.  BPH, urinary retention  Continue PTA finasteride, tamsulosin       Diet: Combination Diet Low Saturated Fat Na  <2400mg Diet, No Caffeine Diet    DVT Prophylaxis: Warfarin  Shepard Catheter: PRESENT, indication:    Central Lines: None  Cardiac Monitoring: ACTIVE order. Indication: AMI (NSTEMI/ STEMI) (48 hours)  Code Status: Full Code      Clinically Significant Risk Factors Present on Admission        # Hypokalemia: Lowest K = 3.2 mmol/L (Ref range: 3.4-5.3) in last 2 days, will replace as needed        # Drug Induced Coagulation Defect: home medication list includes an anticoagulant medication                Disposition Plan      Expected Discharge Date: 11/10/2022                The patient's care was discussed with the Patient.    Blade Sams MD  Hospitalist Service  [facility]  Securely message with the Vocera Web Console (learn more here)  Text page via [employer] Paging/Directory         ______________________________________________________________________    Chief Complaint   Generalized weakness, low back pain/left hip pain    History is obtained from the patient, electronic health record and emergency department physician    History of Present Illness   Harish Kirkland is a 89 year old male who was brought to the ED by ambulance from independent living facility for evaluation of above chief complaint.  Past medical history of aortic stenosis status post TAVR, chronic atrial fibrillation, CKD 3, hyperlipidemia, anemia, lumbar spinal stenosis fusion, urinary retention, peripheral neuropathy.  Patient fell off the bed and was evaluated in the ED on 11/3/2022 for left knee pain but work-up was unremarkable including CT head and cervical spine.  He spends most of the time on either recliner or a wheelchair however he can transfer.  Since the fall, patient was noted with generalized weakness, decreased mobility and complaining of low back and left hip pain.  He denies fevers, chills, chest pain, shortness of breath, palpitations, nausea, abdominal pain, vomiting, diarrhea, melena or hematochezia.   He reports a good appetite and regular bowel movements.  Patient currently denies low back pain but complained of a mild left hip/buttock pain.    Review of Systems    The 10 point Review of Systems is negative other than noted in the HPI or here.     Past Medical History    I have reviewed this patient's medical history and updated it with pertinent information if needed.   No past medical history on file.    Past Surgical History   I have reviewed this patient's surgical history and updated it with pertinent information if needed.  No past surgical history on file.    Social History   I have reviewed this patient's social history and updated it with pertinent information if needed.       Family History     Family history reviewed and noncontributory to current hospitalization    Prior to Admission Medications   Prior to Admission Medications   Prescriptions Last Dose Informant Patient Reported? Taking?   Multiple Vitamins-Minerals (PRESERVISION AREDS) TABS Unknown  Yes Yes   Sig: Take 2 tablets by mouth 2 times daily   aspirin 81 MG EC tablet Unknown at am  Yes Yes   Sig: Take 81 mg by mouth daily   finasteride (PROSCAR) 5 MG tablet Unknown at pm  Yes Yes   Sig: Take 5 mg by mouth daily   furosemide (LASIX) 20 MG tablet Unknown  Yes Yes   Sig: Take 20 mg by mouth 2 times daily   metoprolol tartrate (LOPRESSOR) 100 MG tablet Unknown at pm  Yes Yes   Sig: Take 150 mg by mouth daily   psyllium (METAMUCIL/KONSYL) Packet Unknown  Yes Yes   Sig: Take 1 packet by mouth daily   simvastatin (ZOCOR) 40 MG tablet Unknown at pm  Yes Yes   Sig: Take 40 mg by mouth daily   tamsulosin (FLOMAX) 0.4 MG capsule Unknown at am  Yes Yes   Sig: Take 0.4 mg by mouth daily   warfarin ANTICOAGULANT (COUMADIN) 1 MG tablet Unknown  Yes Yes   Sig: Take 1-2 mg by mouth daily 2mg MWF; 1mg all other days      Facility-Administered Medications: None     Allergies   No Known Allergies    Physical Exam   Vital Signs: Temp: 97.5  F (36.4  C) Temp  src: Oral BP: 117/68 Pulse: 93   Resp: 14 SpO2: 95 % O2 Device: None (Room air)    Weight: 162 lbs 0 oz    Constitutional: awake and alert  Respiratory: no increased work of breathing, good air exchange and clear to auscultation  Cardiovascular: regular rate and rhythm and normal S1 and S2  GI: normal bowel sounds, soft and non-tender  Skin: no bruising or bleeding and no redness, warmth, or swelling  Musculoskeletal: no lower extremity pitting edema present  there is no redness, warmth, or swelling of the joints  Neurologic: Mental Status Exam:  Level of Alertness:   awake  Motor Exam:  moves all extremities well and symmetrically  Neuropsychiatric: General: normal and calm    Data   Data reviewed today: I reviewed all medications, new labs and imaging results over the last 24 hours. I personally reviewed the EKG tracing showing Atrial fibrillation at 84 bpm, PVC, LBBB, nonspecific T waves lateral leads, no significant changes from previous EKG.    Recent Labs   Lab 11/08/22  1858 11/03/22  1416   WBC 7.8 7.9   HGB 11.3* 12.1*   MCV 87 87    239   INR 5.28* 2.53*    138   POTASSIUM 3.2* 4.1   CHLORIDE 103 100   CO2 23 25   BUN 29.5* 20.8   CR 1.28* 1.30*   ANIONGAP 14 13   SHENG 8.7* 9.3   * 111*     Recent Results (from the past 24 hour(s))   Head CT w/o contrast    Narrative    EXAM: CT HEAD W/O CONTRAST  LOCATION: [facility]  DATE/TIME: 11/8/2022 6:36 PM    INDICATION: fall, head injury, anticoagulated  COMPARISON: CT head 11/03/2022  TECHNIQUE: Routine CT Head without IV contrast. Multiplanar reformats. Dose reduction techniques were used.    FINDINGS:  INTRACRANIAL CONTENTS: No intracranial hemorrhage, extraaxial collection, or mass effect.  No CT evidence of acute infarct. Mild presumed chronic small vessel ischemic changes. Focal small encephalomalacia left parieto-occipital region. Mild generalized   volume loss. No hydrocephalus.     VISUALIZED  ORBITS/SINUSES/MASTOIDS: Prior left cataract surgery. Visualized portions of the orbits are otherwise unremarkable. Mild mucosal thickening primarily involving the left ethmoid air cells. Mild fluid/membrane thickening in the left and mastoid   air cells at the tip. No apparent mass in the posterior nasopharynx or skull base.    BONES/SOFT TISSUES: No acute abnormality.      Impression    IMPRESSION:  1.  No acute intracranial process.   XR Chest 1 View    Narrative    EXAM: XR CHEST 1 VIEW  LOCATION: [facility]  DATE/TIME: 11/8/2022 6:37 PM    INDICATION: weakness  COMPARISON: None.      Impression    IMPRESSION: Heart size prominent on this AP view. Pulmonary vascularity normal. Subsegmental atelectasis or scarring left base, lungs otherwise clear.   Lumbar spine CT w/o contrast    Narrative    EXAM: CT LUMBAR SPINE W/O CONTRAST  LOCATION: [facility]  DATE/TIME: 11/8/2022 6:37 PM    INDICATION: fall, pain  COMPARISON: CT 08/07/2017  TECHNIQUE: Routine CT Lumbar Spine without IV contrast. Multiplanar reformats. Dose reduction techniques were used.     FINDINGS:  VERTEBRA: Normal vertebral body heights. No fracture or posttraumatic subluxation. Slight retrolisthesis L5-S1. Status post posterior instrumented fusion with decompression L1-L3. Hardware is intact. Osteopenic bones.    CANAL/FORAMINA: Advanced degenerative changes. No canal or neural foraminal stenosis.    PARASPINAL: No extraspinal abnormality.      Impression    IMPRESSION:  1.  No acute fracture or posttraumatic subluxation.     CT Hip Left w/o Contrast    Narrative    EXAM: CT HIP LEFT W/O CONTRAST  LOCATION: [facility]  DATE/TIME: 11/8/2022 6:38 PM    INDICATION: 89-year-old patient with left-sided hip pain after a fall.  COMPARISON: None.  TECHNIQUE: Noncontrast. Axial, sagittal and coronal thin-section reconstruction. Dose reduction techniques were used.     FINDINGS:      BONES AND JOINTS:  -Bone demineralization.  -No proximal femur fracture.  -Sclerotic and hypertrophic changes of the left ischium, with adjacent soft tissue heterotopic ossification.  Normal joint alignment.    SOFT TISSUES:  -Moderate fatty atrophy of the left gluteus minimus and medius muscles.  -Soft tissue thickening superficial to the left greater trochanter.  -Soft tissue thickening in the left ischiorectal subcutaneous tissues, extending to the left ischium.  -Small left bladder diverticulum.       Impression    IMPRESSION:  1.  No fracture or joint malalignment.  2.  Bone demineralization.  3.  Adventitial bursa-probable developing decubitus ulcer in the left ischiorectal fossa. There is soft tissue in this region, extending to the left ischial tuberosity.  4.  Reactive sclerosis and hypertrophic changes in the left ischium, likely reactive to the above. No definitive findings of osteomyelitis (bone destruction or fragmentation). If this is on the differential, MRI is recommended in further evaluation.

## 2022-11-09 NOTE — PLAN OF CARE
Problem: Plan of Care - These are the overarching goals to be used throughout the patient stay.    Goal: Optimal Comfort and Wellbeing  Outcome: Progressing     Pt comfortable overnight, no pain reported. Chronic Afib present.   Shepard in place for urine retention, nirmala urine.   Pressure ulcer from home on left buttocks. Mepilex covered.  A/O 4x, makes needs known.     Still feels weak overnight. Stayed in bed overnight. Turned self in bed.

## 2022-11-09 NOTE — PHARMACY-ANTICOAGULATION SERVICE
Clinical Pharmacy - Warfarin Dosing Consult     Pharmacy has been consulted to manage this patient s warfarin therapy.  Indication: Atrial Fibrillation  Therapy Goal: INR 2-3  Warfarin Prior to Admission: Yes  Warfarin PTA Regimen: 2 mg MWF, 1 mg all other days  Significant drug interactions: Aspirin, simvastatin, CTX  Recent documented change in oral intake/nutrition: Unknown    INR   Date Value Ref Range Status   11/08/2022 5.28 (HH) 0.85 - 1.15 Final   11/03/2022 2.53 (H) 0.85 - 1.15 Final       Recommend warfarin no dose today.  Pharmacy will monitor Harish Kirkland daily and order warfarin doses to achieve specified goal.      Please contact pharmacy as soon as possible if the warfarin needs to be held for a procedure or if the warfarin goals change.

## 2022-11-09 NOTE — CONSULTS
We have been asked to see Harish Kirkland at [facility] by Dr. Blade Sams for evaluation of elevated troponin.    Impression and Plan     Assessment:  1. Elevated high-sensitivity troponin. Given the lack of serial change and no cardiac symptoms, I do not think this represents acute myocardial injury.  2. Borderline reduced left ventricular ejection fraction of 45-50% which may be due due to a combination of variability seen in the presence of atrial fibrillation as well as dyssynchrony from a left bundle branch block. His left ventricular ejection fraction has been noted to be 50% on prior echocardiograms. He does not appear to be in heart failure.  3. Mild nonobstructive coronary artery disease seen on coronary angiography 8/29/2017. He denies angina.  4. Severe aortic stenosis status post transcatheter aortic valve replacement with a 29 mm Medtronic Evolut Pro bioprosthetic aortic valve on 10/25/2017. Echocardiograph today showed no prosthetic stenosis but mild-to-moderate prosthetic regurgitation (which is unlikely to cause any symptoms.)  5. Permanent atrial fibrillation first noted incidentally 8/24/2011.  6. Chronic left bundle branch block first noted 10/25/2017 following transcatheter aortic valve replacement.  7. Hyperlipidemia.  8. Mechanical fall.    Plan:    No additional inpatient cardiac workup or treatment is necessary.    Continue his current cardiac medications including aspirin and warfarin once his INR normalizes.    He should follow-up with his outpatient cardiology team at [facility], as he has not been seen since 10/78/2021    We will sign off at this time. Please do not hesitate to contact us with additional questions or concerns.    Primary Cardiologist: previously followed at [facility]     Clinically Significant Risk Factors Present on Admission     # Hypokalemia: Lowest K = 3.2 mmol/L (Ref range: 3.4-5.3) in last 2 days, will  replace as needed         # Drug Induced Coagulation Defect: home medication list includes an anticoagulant medication      Cardiovascular: Cardiac Arrhythmia: Atrial fibrillation: Permanent      Systemic: Chronic Fatigue and Other Debilities: Limitation of activities due to disability       History of Present Illness      Mr. Harish Kirkland is a 89 year old male with a history of severe aortic stenosis s/p 29 mm Medtronic Evolut Pro bioprosthetic aortic valve placement 10/25/2017, permanent AF first noted incidentally 8/24/2011, chronic LBBB first noted 10/25/2017 following TAVR, and HLD admitted yesterday after a fall. He was also seen in the ED for this on 11/3/2022.     He lives in an assisted living facility. He says he does not walk and gets around with a wheelchair. He said this time he just slid out of bed. He denies chest pain/pressure/tightness, shortness of breath at rest or with exertion, light headedness/dizziness, pre-syncope, syncope, lower extremity swelling, palpitations, paroxysmal nocturnal dyspnea (PND), or orthopnea.    In the ED, vitals have been stable. ECG shows rate-controlled with LBBB. Imaging has shown no evidence of acute fracture or other pathology. High-sensitivity troponin was elevated at 121 and relatively unchanged on repeat at 123. INR was 5.28.    Review of Systems:  Further review of systems is otherwise negative/noncontributory (based on review of medical record (admission H&P) and 13 point review of systems reviewed. Pertinent positives noted).    Cardiac Diagnostics     ECG 11/8/2022 (personally reviewed and interpreted): atrial fibrillation ventricular rate 84 bpm, LBBB with QRS duration 146 ms, LAD, PVCs    Echocardiogram 11/9/2022 (results reviewed):   1. Left ventricular size is normal. Mild concentric increase in wall thickness. Systolic function is mildly reduced with global hypokinesis and [id number] variation due to the presence of atrial fibrillation. The  estimated left ventricular ejection fraction is 45-50%.  2. Right ventricular size is mildly enlarged. Systolic function is mildly reduced.  3. Moderate biatrial enlargement.  4. Negative bubble study suggesting against the presence of significant cardiac shunting.  5. A well-seated bioprosthetic aortic valve (unknown size or ) is present. No significant prosthetic stenosis with peak forward velocity 1.6 m/s, mean gradient 6 mm Hg, and dimensionless index 0.47. There is mild-to-moderate highly eccentric prosthetic regurgitation which appears valvular rather than paravalvular.  6. Moderate functional tricuspid regurgitation.7. Mild pulmonary hypertension is present with an estimated pulmonary artery systolic pressure of 36 mm Hg.   8. No prior study available for comparison.    Echocardiogram 9/23/2019 (results reviewed):   1. Mild concentric LVH. Normal LV size and systolic function.     2. Mild RV dilation with normal systolic function.   3. Moderate LA dilatation.   4. Moderate to severe RA dilatation.    5. 23 mm Medtronic Evolut bioprosthetic valve in the aortic position. Normal function.   6. Mild+ mitral regurgitation.   7. Moderate tricuspid regurgitation.   8. Mildly dilated aortic root (4.2 cm) and ascending aorta (4.2 cm).   9. Compared to prior report, MR and TR may be slightly worse. Otherwise no significant change.     Cardiac Cath 8/29/2017 (results reviewed):  Non-occlusive coronary artery disease.       LVEDP is 14 mm Hg   PCWP 12 mm Hg   PA 38/17 (25) mm Hg   RV 33/5 mm Hg   RA 5 mm Hg     Aortic Valve: Baseline: Mean gradient 14 mm Hg (obtained via dual lumen pigtail), RITA 1.23 CM2. After dobutamine infusion: mean gradient 24 mm Hg, RITA 0.96 CM2     Peripheral angiogram demonstrates adequate vasculature for TAVR via transfemoral approach.     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 Available in Admin tab of Chart Review.    Careplan: Anti-Coagulation   [id number] score=3 Stephany Bassett RN/[facility] 10/6/2021 Anticoagulation Careplan for Harish Kirkland Diagnoses: Atrial Fib, TAVR Therapeutic range: 2 - 3. Warfarin pill strength: 5 mg. Initiation date ( AKA date when started on warfarin ): 9/30/11. Length of treatment: long-term. Comments: none Marina Marshall RN    Carpal tunnel syndrome 3/7/2011     Colon adenomas 5/9/2008 Colon Adenomas-2008   Glaucoma suspect 11/11/2013 Risk: IOP, Age, FH(F) Peak IOP: 22/24 Pach: Q1y visit    Low back pain potentially associated with radiculopathy 8/22/2017 Added automatically from request for surgery [id number]   Nuclear cataract 11/4/2013 Nuclear Cataract right eye   Peripheral neuropathy 2/3/2011     Pseudophakia 11/2/2012 left eye 1999 Dr. Moran; MA60BM 21.0 (-2.00 +1.75); had yag posterior capsulotomy in left eye ; Pseudophakia left eye   S/p TAVR (transcatheter aortic valve replacement), bioprosthetic ([facility]) 10/26/2017 Mr. Kirkland is s/p percutaneous transfemoral TAVR with 29 mm Medtronic Evolut self-expanding bioprosthetic valve under general anesthesia on 10/25/2017 by Dr. Arabella Suárez. Please contact the TAVR team at [phone] with any cardiac questions or concerns.   Spinal stenosis of lumbar region  6/6/2018 Added automatically from request for surgery [id number]   Status post lumbar spinal fusion 8/21/2017 Status post lumbar spinal fusion L1-3   Vasomotor rhinitis 4/22/2011      T&A; UNDER AGE 12 1/1/1938 - 12/31/1938        URETERONEOCYSTOSTOMY; 1 URETER 1/1/1971 - 12/31/1971   ureter reduction and bladder repair - had ureter obstructor - no stones     REP ING ALAINA PRTERM INFNT; RDUC 1984,91,96   Rx2,Lx1     LAMINECT W/REMOV ABNL FACETS-LUMBAR 1/1/2000 - 12/31/2000        CATARACT REMOVALT W/CORNEO-SCLERAL 1/1/1999 - 12/31/1999   L     DISCISSION 2ND CATARACT; LASER SURG 4/21/05   OS     VITRECTOMY MEC; W/PANRETINAL PHOTO 1/1/2003 - 12/31/2003   OS     LAMINECT W/REMOV ABNL FACETS-LUMBAR 9/14/11   spinal stenosis     Bilateral Revision L4-5 laminectomy, medial facetectomy, proximal foraminotomy (Bilateral) 06/21/2018 Bilateral Dr. Ken Correa      Heart Attack Birth Father        Lung Disease Birth Father    copd   Diabetes Birth Mother        Glaucoma Birth Mother        Diabetes Sister 2       Obesity Sister 3       Macular Degeneration Son        Anesthesia Reaction Negative Family History        Bleeding Disorder Negative Family History        Cataract Negative Family History           Social History     Socioeconomic History     Marital status:      Spouse name: Not on file     Number of children: Not on file     Years of education: Not on file     Highest education level: Not on file   Occupational History     Not on file   Tobacco Use     Smoking status: Not on file     Smokeless tobacco: Not on file   Substance and Sexual Activity     Alcohol use: Not on file     Drug use: Not on file     Sexual activity: Not on file   Other Topics Concern     Not on file   Social History Narrative     Not on file     Social Determinants of Health     Financial Resource Strain: Not on file   Food Insecurity: Not on file   Transportation Needs: Not on file   Physical Activity: Not on file   Stress: Not on file  "  Social Connections: Not on file   Intimate Partner Violence: Not on file   Housing Stability: Not on file             Physical Examination   VITALS: /59 (BP Location: Left arm)   Pulse 85   Temp 97.9  F (36.6  C) (Oral)   Resp 18   Ht 1.88 m (6' 2\")   Wt 73.5 kg (162 lb)   SpO2 97%   BMI 20.80 kg/m    BMI: Body mass index is 20.8 kg/m .  Wt Readings from Last 3 Encounters:   11/08/22 73.5 kg (162 lb)     Intake/Output Summary (Last 24 hours) at 11/9/2022 1530  Last data filed at 11/9/2022 0600  Gross per 24 hour   Intake 10 ml   Output 1050 ml   Net -1040 ml       General Appearance:  Alert, cooperative, no distress, appears stated age    Head:  Normocephalic, without obvious abnormality, atraumatic   Eyes:  PERRL, conjunctiva/corneas clear, EOM's intact   Ears:  Normal external ear canals bilaterally   Nose: Nares normal, septum midline, no drainage   Throat: Lips, mucosa, and tongue normal; teeth and gums normal   Neck: Supple, symmetrical, trachea midline, no adenopathy, no carotid bruit or JVD   Back:   Symmetric, no abnormal curvature, ROM normal   Lungs:   Clear to auscultation bilaterally, respirations unlabored   Chest Wall:  No tenderness or deformity   Heart:  Irregularly irregular. S1, S2 normal,no murmur, rub or gallop   Abdomen:   Soft, non-tender, bowel sounds active all four quadrants,  no masses, no organomegaly   Extremities: Extremities normal, atraumatic, no cyanosis or edema   Skin: Skin color, texture, turgor normal, no rashes or lesions   Psychiatric: Normal affect, pleasant and cooperative   Neurologic: Alert and oriented X 3, Moves all 4 extremities            Imaging      CXR 11/8/2022 (report reviewed):  1. Heart size prominent on this AP view.   2. Pulmonary vascularity normal.   3. Subsegmental atelectasis or scarring left base, lungs otherwise clear.     Lab Results    Chemistry/lipid CBC Cardiac Enzymes/BNP/TSH/INR     Recent Labs   Lab Test 11/09/22  0809    "   POTASSIUM 3.3*   CHLORIDE 106   CO2 24   GLC 95   BUN 25.1*   CR 1.03   GFRESTIMATED 69   SHENG 8.6*     Recent Labs   Lab Test 11/09/22  0809 11/08/22  1858 11/03/22  1416   CR 1.03 1.28* 1.30*     Recent Labs   Lab Test 01/13/22  0736   A1C 5.4          Recent Labs   Lab Test 11/09/22  0809   WBC 6.7   HGB 10.9*   HCT 33.7*   MCV 87        Recent Labs   Lab Test 11/09/22  0809 11/08/22  1858 11/03/22  1416   HGB 10.9* 11.3* 12.1*      Recent Labs   Lab Test 11/09/22  0809   TSH 2.39     Recent Labs   Lab Test 11/09/22  0809 11/08/22 1858 11/03/22  1416   INR 4.57* 5.28* 2.53*           Current Inpatient Scheduled Medications   Scheduled Meds:    aspirin  81 mg Oral Daily     cefTRIAXone  1 g Intravenous Q24H     finasteride  5 mg Oral Daily     folic acid  1 mg Oral Daily     furosemide  20 mg Oral BID     metoprolol tartrate  150 mg Oral Daily     multivitamin  with lutein  1 capsule Oral BID     psyllium  1 packet Oral Daily     simvastatin  40 mg Oral Daily     sodium chloride (PF)  3 mL Intracatheter Q8H     tamsulosin  0.4 mg Oral Daily     thiamine  100 mg Oral Daily     Warfarin Therapy Reminder  1 each Oral See Admin Instructions     warfarin-No DOSE today  1 each Does not apply no dose today (warfarin)     Current Outpatient Medications   Medication Sig Dispense Refill     aspirin 81 MG EC tablet Take 81 mg by mouth daily       finasteride (PROSCAR) 5 MG tablet Take 5 mg by mouth daily       furosemide (LASIX) 20 MG tablet Take 20 mg by mouth 2 times daily       metoprolol tartrate (LOPRESSOR) 100 MG tablet Take 150 mg by mouth daily       Multiple Vitamins-Minerals (PRESERVISION AREDS) TABS Take 2 tablets by mouth 2 times daily       psyllium (METAMUCIL/KONSYL) Packet Take 1 packet by mouth daily       simvastatin (ZOCOR) 40 MG tablet Take 40 mg by mouth daily       tamsulosin (FLOMAX) 0.4 MG capsule Take 0.4 mg by mouth daily       warfarin ANTICOAGULANT (COUMADIN) 1 MG tablet Take 1-2 mg by  mouth daily 2mg MWF; 1mg all other days            Medications Prior to Admission   Prior to Admission medications    Medication Sig Start Date End Date Taking? Authorizing Provider   aspirin 81 MG EC tablet Take 81 mg by mouth daily   Yes Unknown, Entered By History   finasteride (PROSCAR) 5 MG tablet Take 5 mg by mouth daily   Yes Unknown, Entered By History   furosemide (LASIX) 20 MG tablet Take 20 mg by mouth 2 times daily   Yes Unknown, Entered By History   metoprolol tartrate (LOPRESSOR) 100 MG tablet Take 150 mg by mouth daily   Yes Unknown, Entered By History   Multiple Vitamins-Minerals (PRESERVISION AREDS) TABS Take 2 tablets by mouth 2 times daily   Yes Unknown, Entered By History   psyllium (METAMUCIL/KONSYL) Packet Take 1 packet by mouth daily   Yes Unknown, Entered By History   simvastatin (ZOCOR) 40 MG tablet Take 40 mg by mouth daily   Yes Unknown, Entered By History   tamsulosin (FLOMAX) 0.4 MG capsule Take 0.4 mg by mouth daily   Yes Unknown, Entered By History   warfarin ANTICOAGULANT (COUMADIN) 1 MG tablet Take 1-2 mg by mouth daily 2mg MWF; 1mg all other days   Yes Unknown, Entered By History          Cristhian Vaz MD [facility]  Non-Invasive Cardiologist  [facility]  Pager [phone]

## 2022-11-09 NOTE — ED NOTES
Urine drained from the hagen. Urine in the bag is dark yellow. However, the line appears to have thick tan mucus in it. UA has already been sent.

## 2022-11-10 NOTE — PROGRESS NOTES
[facility]    Medicine Progress Note - Hospitalist Service    Date of Admission:  11/8/2022    Assessment & Plan     Harish Kirkland is a 89 year old male admitted on 11/8/2022. He was sent to the ED by ambulance from independent living facility for evaluation of increased weakness, decreased mobility and low back/left hip pain.     1.  Elevated troponin, hx of CAD, no evidence for acute injury  -High-sensitivity troponin T 123 and 121  -Patient denies angina symptoms  -EKG showed nonspecific changes  -Telemetry monitoring  -Echocardiogram to evaluate structure and function     1. Left ventricular size is normal. Mild concentric increase in wall  thickness. Systolic function is mildly reduced with global hypokinesis and  wqxi-hj-zjfh variation due to the presence of atrial fibrillation. The  estimated left ventricular ejection fraction is 45-50%.  2. Right ventricular size is mildly enlarged. Systolic function is mildly  reduced.  3. Moderate biatrial enlargement.  4. Negative bubble study suggesting against the presence of significant  cardiac shunting.  5. A well-seated bioprosthetic aortic valve (unknown size or ) is  present. No significant prosthetic stenosis with peak forward velocity 1.6  m/s, mean gradient 6 mm Hg, and dimensionless index 0.47. There is mild-to-  moderate highly eccentric prosthetic regurgitation which appears valvular  rather than paravalvular.  6. Moderate functional tricuspid regurgitation.  7. Mild pulmonary hypertension is present with an estimated pulmonary artery  systolic pressure of 36 mm Hg.  8. No prior study available for comparison.    -Cardiology consult-no further work up    2.Hx of bio valve AVR  -echo ok    3.Permanent  A fib   -Rate is controlled, continue PTA metoprolol  -Supratherapeutic INR 5.28 on admit  - pharmacy to dose    4.  Generalized weakness  -Likely in the setting of recent fall, deconditioning and UTI  -Patient can  transfer but spends most of the time on a wheelchair or recliner  -Supportive management  -Fall precautions  -PT/OT evaluation and treatment  - for disposition, may need TCU at discharge  -check tsh-2.39  -check [id number]  -check b1, daily etoh per son, added thiamine po     5.  Acute cystitis without hematuria  -Continue IV ceftriaxone  -Follow-up urine culture     6.  Last ischial tuberosity stage II pressure ulcer, present on admission  CT scan showed evidence of ischial bursa-probable developing decubitus ulcer in the left ischiorectal fossa, there is soft tissue in this region, extending to the left ischial tuberosity.  Reactive sclerosis and hypertrophic changes in the left ischium likely reactive, no definite findings of osteomyelitis.  -Wound care consult     7.  Hypokalemia  -Replaced per protocol  -check mag     8.  Chronic kidney disease stage IIIa  -Stable renal function  -Monitor volume status  -Avoid nephrotoxins     9.  Chronic anemia  -Stable hemoglobin without signs of acute blood loss     10.  Hyperlipidemia  -Continue PTA simvastatin     11.  BPH, urinary retention  -Continue PTA finasteride, tamsulosin     12.Dispo-needs pt/ot eval . I am concerned his AL may not be enough  -I hope that he can go to TCU for pt/ot and then have re-eval for next step                  Diet: Combination Diet Low Saturated Fat Na <2400mg Diet, No Caffeine Diet    DVT Prophylaxis: Warfarin  Shepard Catheter: PRESENT, indication: Retention  Central Lines: None  Cardiac Monitoring: ACTIVE order. Indication: AMI (NSTEMI/ STEMI) (48 hours)  Code Status: Full Code      Disposition Plan           The patient's care was discussed with the Care Coordinator/ and Patient. I called timo Durham to update at 2598--no answer- left message    Radha Caba MD  Hospitalist Service  [facility]  Securely message with the Vocera Web Console (learn more here)  Text page via [employer]  Jeannineing/Chilo         ______________________________________________________________________    Interval History   He has no concerns  Denied any pains  No cp  No sob      Data reviewed today: I reviewed all medications, new labs and imaging results over the last 24 hours. I personally reviewed no images or EKG's today.    Physical Exam   Vital Signs: Temp: 97.4  F (36.3  C) Temp src: Oral BP: 105/55 Pulse: 88   Resp: 20 SpO2: 91 % O2 Device: None (Room air)    Weight: 179 lbs 14.4 oz  Constitutional: awake, alert, cooperative and no apparent distress  Eyes: sclera clear  Respiratory: no increased work of breathing, good air exchange, no retractions and clear to auscultation  Cardiovascular: irregularly irregular rhythm  GI: normal bowel sounds, soft, non-distended and non-tender  Skin: skin breakdown, over left hip  Musculoskeletal: no lower extremity pitting edema present  Neurologic: Mental Status Exam:  Level of Alertness:   awake  Neuropsychiatric: General: normal, calm and normal eye contact    Data   Recent Labs   Lab 11/10/22  0600 22  1520 22  0809 22  1858   WBC 7.2  --  6.7 7.8   HGB 9.8*  --  10.9* 11.3*   MCV 87  --  87 87     --  230 220   INR 3.78*  --  4.57* 5.28*     --  141 140   POTASSIUM 3.5 3.7 3.3* 3.2*   CHLORIDE 105  --  106 103   CO2 22  --  24 23   BUN 22.0  --  25.1* 29.5*   CR 0.91  --  1.03 1.28*   ANIONGAP 13  --  11 14   SHENG 8.3*  --  8.6* 8.7*   GLC 93  --  95 110*     Recent Results (from the past 24 hour(s))   Echo Complete with Bubble Study   Result Value    LVEF  45-50%    Narrative    [id number]  [id number]  [id number]  [id number]^JOSEFINA^MIKE^[place] [address]     Name: CLEO ARCOS  MRN: [id number]  : 1933  Study Date: 2022 02:08 PM  Age: 89 yrs  Gender: Male  Patient Location: [facility]  Reason For Study: Acute Myocardial Infarction  Ordering Physician: MIKE ROCHA  Performed  By: SANTY     BSA: 2.0 m2  Height: 74 in  Weight: 162 lb  HR: 76  ______________________________________________________________________________  Procedure  Complete Portable Echo Adult. Definity (NDC #20664-293) given intravenously.  Technically difficult study. There is no comparison study available.  ______________________________________________________________________________  Interpretation Summary     1. Left ventricular size is normal. Mild concentric increase in wall  thickness. Systolic function is mildly reduced with global hypokinesis and  bceg-rb-yaoq variation due to the presence of atrial fibrillation. The  estimated left ventricular ejection fraction is 45-50%.  2. Right ventricular size is mildly enlarged. Systolic function is mildly  reduced.  3. Moderate biatrial enlargement.  4. Negative bubble study suggesting against the presence of significant  cardiac shunting.  5. A well-seated bioprosthetic aortic valve (unknown size or ) is  present. No significant prosthetic stenosis with peak forward velocity 1.6  m/s, mean gradient 6 mm Hg, and dimensionless index 0.47. There is mild-to-  moderate highly eccentric prosthetic regurgitation which appears valvular  rather than paravalvular.  6. Moderate functional tricuspid regurgitation.  7. Mild pulmonary hypertension is present with an estimated pulmonary artery  systolic pressure of 36 mm Hg.  8. No prior study available for comparison.  ______________________________________________________________________________  I      WMSI = 2.00     % Normal = 0     X - Cannot   0 -                      (2) - Mildly 2 -          Segments  Size  Interpret    Hyperkinetic 1 - Normal  Hypokinetic  Hypokinetic  1-2     small                                                     7 -          3-5      moderate  3 - Akinetic 4 -          5 -         6 - Akinetic Dyskinetic   6-14    large               Dyskinetic   Aneurysmal  w/scar       w/scar       15-16    diffuse     Left Ventricle  The left ventricle is normal in size. There is mild concentric left  ventricular hypertrophy. The visual ejection fraction is 45-50%. Systolic  function is mildly reduced with czzq-ad-ephq variation due to the presence of  atrial fibrillation. Diastolic function not assessed due to atrial  fibrillation. There is mild global hypokinesia of the left ventricle. Septal  motion is consistent with conduction abnormality.     Right Ventricle  The right ventricle is mildly dilated. Mildly decreased right ventricular  systolic function.     Atria  The left atrium is moderately dilated. The right atrium is moderately dilated.  A contrast injection (Bubble Study) was performed that was negative for flow  across the interatrial septum.     Mitral Valve  The mitral valve leaflets are mildly thickened. There is mild mitral annular  calcification. There is mild (1+) mitral regurgitation. There is no mitral  valve stenosis.     Tricuspid Valve  Tricuspid valve leaflets appear normal. There is dilated tricuspid annulus.  There is moderate (2+) tricuspid regurgitation. The right ventricular systolic  pressure is elevated at 33.1 mmHg. Mild (35-45mmHg) pulmonary hypertension is  present. There is no tricuspid stenosis.     Aortic Valve  A well-seated bioprosthetic aortic valve (unknown size or ) is  present. No significant prosthetic stenosis with peak forward velocity 1.6  m/s, mean gradient 6 mm Hg, and dimensionless index 0.47. There is mild-to-  moderate highly eccentric prosthetic regurgitaton which appears valvular  rather than paravalvular.     Pulmonic Valve  The pulmonic valve is not well visualized. There is mild (1+) pulmonic  valvular regurgitation. There is no pulmonic valvular stenosis.     Vessels  The aorta root cannot be assessed. The thoracic aorta cannot be assessed. IVC  diameter <2.1 cm collapsing >50% with sniff suggests a normal RA pressure of 3  mmHg.      Pericardium  There is no pericardial effusion.     Rhythm  The rhythm was atrial fibrillation.     ______________________________________________________________________________  MMode/2D Measurements & Calculations  IVSd: 1.2 cm  LVIDd: 4.3 cm  LVIDs: 2.7 cm  LVPWd: 1.2 cm     FS: 36.8 %  LV mass(C)d: 185.2 grams  LV mass(C)dI: 93.2 grams/m2  LA dimension: 4.2 cm  LA Volume (BP): 62.8 ml  LA Volume Index (BP): 42.0 ml/m2  RWT: 0.56     Doppler Measurements & Calculations  MV E max andreas: 118.0 cm/sec  MV max P.8 mmHg  MV mean P.1 mmHg  MV V2 VTI: 20.4 cm     MV dec slope: 674.7 cm/sec2  MV dec time: 0.17 sec  Ao V2 max: 163.0 cm/sec  Ao max P.0 mmHg  Ao V2 mean: 117.5 cm/sec  Ao mean P.1 mmHg  Ao V2 VTI: 29.5 cm  LV V1 max P.4 mmHg  LV V1 max: 77.1 cm/sec  LV V1 VTI: 14.5 cm  PA V2 max: 82.9 cm/sec  PA max P.7 mmHg  PA mean P.5 mmHg  PA V2 VTI: 14.5 cm  PA acc time: 0.07 sec  PI end-d andreas: 101.2 cm/sec  TR max andreas: 287.8 cm/sec  TR max P.1 mmHg  AV Andreas Ratio (DI): 0.47  E/E': 17.9  E/E' av.4  Lateral E/e': 17.7  Medial E/e': 23.1  Peak E' Andreas: 6.6 cm/sec     ______________________________________________________________________________  Report approved by: Danae Quiles 2022 03:15 PM

## 2022-11-10 NOTE — PROGRESS NOTES
Care Management Follow Up    Length of Stay (days): 2    Expected Discharge Date: 11/11/2022     Concerns to be Addressed:       Patient plan of care discussed at interdisciplinary rounds: Yes    Anticipated Discharge Disposition:       Anticipated Discharge Services:    Anticipated Discharge DME:      Patient/family educated on Medicare website which has current facility and service quality ratings:    Education Provided on the Discharge Plan:    Patient/Family in Agreement with the Plan:      Referrals Placed by CM/SW:    Private pay costs discussed: Not applicable    Additional Information:  RNCM received consult placed for discharge planning. CM following and intial assessment already completed.     CM will meet with patient to discuss TCU options. [facility] Patient at baseline is wheelchair bound assist of 1.     Cyndy Bhakta RN

## 2022-11-10 NOTE — PROVIDER NOTIFICATION
Sent [employer] message to hospitalist about V. Tach on tele monitor per monitor and Tele monitor tech from [employer] calling.  Will continue on monitor per hospitalist as well as get K+ and Mag redrawn.  Monitor tech said to replace patches so evening R was going to do that.  Checked on pt and he said he was feeling fine and was wanting to take a nap.

## 2022-11-10 NOTE — PROGRESS NOTES
[facility] Hospitalist Progress Note  [facility]  Admission date: 11/8/2022    Summary:    89M with Afib, s/p bioprosthetic aortic valve who presented from independent living with increased weakness, decreased mobility and low back/left hip pain.  Noted to have a UTI, mildly elevated high sensitivity troponin, and L ischial tuberosity decubitis ulcer.       Assessment/Plan    #Type2 NSTEMI - seen by cards and no further cardiac workup at this time.      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   -f/u with primary cards at [facility]  -oral lasix    #Permanent  A fib - rate controlled.  Continue lopressor, consider transition to xl.  Coumadin consider DOAC.       #Generalized weakness - Likely in the setting of recent fall, deconditioning and UTI. Patient can transfer but spends most of the time on a wheelchair or recliner.    #UTI - CTX pending cultures.  Growing proteus and citrobacter, sensis pending     #Last ischial tuberosity stage II pressure ulcer, present on admission  CT scan showed evidence of ischial bursa-probable developing decubitus ulcer in the left ischiorectal fossa, there is soft tissue in this region, extending to the left ischial tuberosity.  Reactive sclerosis and hypertrophic changes in the left ischium likely reactive, no definite findings of osteomyelitis.  -Wound care consult     #Hypokalemia - resolved     #Chronic kidney disease stage IIIa     # Chronic anemia - Stable hemoglobin without signs of acute blood loss     #Hyperlipidemia -Continue PTA simvastatin     #BPH, urinary retention - Continue PTA finasteride, tamsulosin     #Dispo- TCU     Checklist:  Code Status: Full Code    Diet: Combination Diet Low Saturated Fat Na <2400mg Diet, No Caffeine Diet    Shepard Catheter: PRESENT, indication: Retention  Central Lines: None  DVT px:  Warfarin        Auto-populated  based on system request - if relevant they will be addressed above:  Clinically Significant Risk Factors        # Hypokalemia: Lowest K = 3.2 mmol/L (Ref range: 3.4-5.3) in last 2 days, will replace as needed                        Auto-populated from discharge tab:     Expected Discharge Date: 11/14/2022    Discharge Delays: Placement - TCU    Discharge Comments: needs tcu and there is chance tcu will not take til 11/14         Overnight Events/Subjective/Notable results:    No complaints.  No pain.  4 point ROS otherwise negative    Objective    Vital signs in last 24 hours  Temp:  [97.4  F (36.3  C)-98.9  F (37.2  C)] 98.9  F (37.2  C)  Pulse:  [76-88] 78  Resp:  [16-20] 18  BP: (105-134)/(55-65) 112/65  SpO2:  [91 %-97 %] 96 % O2 Device: None (Room air)    Weight:   179 lbs 14.4 oz    Intake/Output last 3 shifts  I/O last 3 completed shifts:  In: 720 [P.O.:720]  Out: 1500 [Urine:1500]  Body mass index is 23.1 kg/m .    Physical Exam  General:  Alert, cooperative, no distress , weak and frail  Neurologic:   facial symmetry preserved, fluent speech.   Psych: calm, mood and affect appropriate to situation  HEENT:  Anicteric, MMM  CV: RRR no MRG, normal S1 and S2, no edema  Lungs: CTAB.  Easyrespirations  Abd: soft, NT, normoactive BS  Skin: no rashes or jaundice noted on exposed skin.  decubitus ulcer covered      I have reviewed all labs, medications, imaging studies in the last 24 hours.  Pertinent findings&changes discussed above.    Data       Sheyla Jerry MD, [facility]  Internal Medicine Hospitalist

## 2022-11-10 NOTE — PLAN OF CARE
Problem: Plan of Care - These are the overarching goals to be used throughout the patient stay.    Goal: Plan of Care Review  Description: The Plan of Care Review/Shift note should be completed every shift.  The Outcome Evaluation is a brief statement about your assessment that the patient is improving, declining, or no change.  This information will be displayed automatically on your shift note.  Outcome: Progressing   Goal Outcome Evaluation:       Pt alert and oriented but forgetful. Continues to receive ceftriaxone.  Afebrile. On RA. Shepard in place. Patent. Urine is orange in color.

## 2022-11-10 NOTE — PLAN OF CARE
Problem: Activity Intolerance  Goal: Enhanced Capacity and Energy  Outcome: Progressing     Problem: Fall Injury Risk  Goal: Absence of Fall and Fall-Related Injury  Outcome: Progressing  Intervention: Identify and Manage Contributors  Recent Flowsheet Documentation  Taken 11/10/2022 0330 by Uri Hardwick RN  Medication Review/Management: medications reviewed  Taken 11/10/2022 0030 by Uri Hardwick RN  Medication Review/Management: medications reviewed  Intervention: Promote Injury-Free Environment  Recent Flowsheet Documentation  Taken 11/10/2022 0330 by Uri Hardwick RN  Safety Promotion/Fall Prevention:   activity supervised   assistive device/personal items within reach   clutter free environment maintained   fall prevention program maintained   nonskid shoes/slippers when out of bed   patient and family education  Taken 11/10/2022 0030 by Uri Hardwick RN  Safety Promotion/Fall Prevention:   activity supervised   assistive device/personal items within reach   clutter free environment maintained   fall prevention program maintained   nonskid shoes/slippers when out of bed   patient and family education     Slept well overnight. A&O x3. Some confusion to situation. Denies pain. On telemetry, afib with BBB and PVCs. Bed alarm armed, and reminded to call before getting up.    Uri Hardwick, KEO

## 2022-11-10 NOTE — PROGRESS NOTES
Assessed, spoke to pt and son Hudson, pt lives at [facility], POLST sent to [facility], pt's family would try to transport pending his ability to stand and transfer. Pt is wheelchair bound w/ast of 1 and family is supportive.

## 2022-11-11 NOTE — PROGRESS NOTES
Care Management Follow Up    Length of Stay (days): 3    Expected Discharge Date: 11/14/2022     Concerns to be Addressed:       Patient plan of care discussed at interdisciplinary rounds: Yes    Anticipated Discharge Disposition:       Anticipated Discharge Services:    Anticipated Discharge DME:      Patient/family educated on Medicare website which has current facility and service quality ratings:    Education Provided on the Discharge Plan:    Patient/Family in Agreement with the Plan:      Referrals Placed by CM/SW:    Private pay costs discussed: Not applicable    Additional Information:  Patient plan to discharge to [facility] on Monday at 1pm. Patient will need PAS and wheelchair transport arranged. (TCU unable to accept patient this weekend).     Patient lives in [place] at [facility]. Recently decline, was not going down to eat meals and such.     CM to continue to follow care progression and aide in discharge planning as needed.     Cyndy Bhakta RN

## 2022-11-11 NOTE — PLAN OF CARE
Goal Outcome Evaluation:             Pt refused to transfer out of bed, laying on right side. Mild confusion, alert to situation and place and person. Appetite okay but drowsy. Dressings changed on coccyx and buttocks.

## 2022-11-11 NOTE — PLAN OF CARE
Problem: Activity Intolerance  Goal: Enhanced Capacity and Energy  Outcome: Progressing  Intervention: Optimize Activity Tolerance  Recent Flowsheet Documentation  Taken 11/10/2022 1635 by Denise Shahid RN  Activity Management: activity adjusted per tolerance   Pt is on air mattress but doesn't like it because of the noise it makes. Ear plugs given. Refuses repositioning. [place]. Vision loss. Poor appetite. Encouraged to drink. Dressing change done.   Problem: UTI (Urinary Tract Infection)  Goal: Improved Infection Symptoms  Outcome: Progressing   Goal Outcome Evaluation:       Afebrile. Shepard patent. On rocephin.

## 2022-11-11 NOTE — PLAN OF CARE
Problem: Fall Injury Risk  Goal: Absence of Fall and Fall-Related Injury  11/11/2022 0551 by Janeth Rosas RN  Outcome: Progressing  11/11/2022 0551 by Janeth Rosas RN  Outcome: Progressing  Intervention: Promote Injury-Free Environment  Recent Flowsheet Documentation  Taken 11/11/2022 0512 by Janeth Rosas RN  Safety Promotion/Fall Prevention: activity supervised  Taken 11/11/2022 0050 by Janeth Rosas RN  Safety Promotion/Fall Prevention: activity supervised     Problem: UTI (Urinary Tract Infection)  Goal: Improved Infection Symptoms  11/11/2022 0551 by Janeth Rosas RN  Outcome: Progressing  11/11/2022 0551 by Janeth Rosas RN  Outcome: Progressing     Problem: Dysrhythmia  Goal: Normalized Cardiac Rhythm  Outcome: Progressing     Goal Outcome Evaluation:         Denied any pain. Chronic afib. HR controlled. Getting Iv antibiotics for his UTI. Shepard intact. Good urine output. PT/OT. Patient disoriented to time and situation.

## 2022-11-12 NOTE — PLAN OF CARE
Goal Outcome Evaluation:             Left buttocks dressing changed. Up to chair for meal, asked to lay down reporting feeling bad, but not in pain. Episodes of heart rate of 49, pt. Asymptomatic. Appetite fair. Output from hagen consistent.

## 2022-11-12 NOTE — PLAN OF CARE
Problem: Plan of Care - These are the overarching goals to be used throughout the patient stay.    Goal: Plan of Care Review  Description: The Plan of Care Review/Shift note should be completed every shift.  The Outcome Evaluation is a brief statement about your assessment that the patient is improving, declining, or no change.  This information will be displayed automatically on your shift note.  Outcome: Progressing  Flowsheets (Taken 11/11/2022 2148)  Plan of Care Reviewed With: patient     Problem: Skin Injury Risk Increased  Goal: Skin Health and Integrity  Outcome: Progressing  Intervention: Optimize Skin Protection  Recent Flowsheet Documentation  Taken 11/11/2022 2041 by Sonia Avila RN  Activity Management: activity adjusted per tolerance  Head of Bed (HOB) Positioning: HOB at 20-30 degrees  Taken 11/11/2022 1900 by Sonia Avila RN  Head of Bed (HOB) Positioning: HOB at 20-30 degrees  Taken 11/11/2022 1700 by Sonia Avila RN  Activity Management: activity adjusted per tolerance  Head of Bed (HOB) Positioning: HOB at 20-30 degrees     Problem: Activity Intolerance  Goal: Enhanced Capacity and Energy  Outcome: Progressing  Intervention: Optimize Activity Tolerance  Recent Flowsheet Documentation  Taken 11/11/2022 2041 by Sonia Avila RN  Activity Management: activity adjusted per tolerance  Taken 11/11/2022 1700 by Sonia Avila RN  Activity Management: activity adjusted per tolerance     Problem: UTI (Urinary Tract Infection)  Goal: Improved Infection Symptoms  Outcome: Progressing   Goal Outcome Evaluation:      Plan of Care Reviewed With: patient      Pt alert and oriented times 3. He was disoriented to time. Vitals stable at the beginning of the shift. However, later around bedtime, his BP was down to 86/46. House doctor was notified and got order for bolus. Gave bolus 500 ml. Recheck BP later after bolus was 108/53. Pt denied pain through out the shift. No  s/s of pain noted. Repositioned every two hours. Pt was refusing to turn to left side. However after explaining, he let staff turn on left side. Pt on mg and potassium protocol. Per protocol, labs recheck in am and no coverage needed. MD ordered 2 gm IV mg and 40 meq KCL once since he had one episode of vtach run around 1400 per MD. Pt's tele rhythm has been a-fib controlled rate with BBB and occasional PVC. Shepard intact and draining well. Pt receiving IV abx for UTI.

## 2022-11-12 NOTE — PROVIDER NOTIFICATION
Pt's BP was low at 86/46 with MAP of 61. Pt asymptomatic. Denied any pain, SOB, lightheaded . House doctor notified and spoke with Dr. Townsend. MD ordered bolus NS. Held scheduled lasix due to low BP. MD notified about it.

## 2022-11-12 NOTE — PROGRESS NOTES
[facility] Hospitalist Progress Note  [facility]  Admission date: 11/8/2022    Summary:    89M with Afib, s/p bioprosthetic aortic valve who presented from independent living with increased weakness, decreased mobility and low back/left hip pain.  Noted to have a UTI, mildly elevated high sensitivity troponin, and L ischial tuberosity decubitis ulcer.       Assessment/Plan    #Type2 NSTEMI - seen by cards and no further cardiac workup at this time.      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   -f/u with primary cards at [facility]  -oral lasix    #Permanent  A fib - rate controlled.  Continue lopressor, consider transition to xl.  Coumadin consider DOAC.  F.u with cardiologist     #NSVT - brief, asymptomatic. lopressor.  Goal K > 4, Mg >2.  Started on daily replacement    #Generalized weakness - Likely in the setting of recent fall, deconditioning and UTI. Patient can transfer but spends most of the time on a wheelchair or recliner.    #UTI, complicated with urinary retention - Growing proteus and citrobacter, transition to omnicef for total 7 days based on sensitivities.  Shepard placed, keep 1 week and TOV as outpatient.  Urology f/u.     #Last ischial tuberosity stage II pressure ulcer, present on admission  CT scan showed evidence of ischial bursa-probable developing decubitus ulcer in the left ischiorectal fossa, there is soft tissue in this region, extending to the left ischial tuberosity.  Reactive sclerosis and hypertrophic changes in the left ischium likely reactive, no definite findings of osteomyelitis.  -Wound care consult     #Hypokalemia - resolved     #Chronic kidney disease stage IIIa     # Chronic anemia - Stable hemoglobin without signs of acute blood loss     #Hyperlipidemia -Continue PTA simvastatin     #BPH, urinary retention - Continue PTA finasteride,  tamsulosin     #Dispo- TCU     Checklist:  Code Status: Full Code    Diet: Combination Diet Low Saturated Fat Na <2400mg Diet, No Caffeine Diet    Shepard Catheter: PRESENT, indication: Retention  Central Lines: None  DVT px:  Warfarin        Auto-populated based on system request - if relevant they will be addressed above:  Clinically Significant Risk Factors                               Auto-populated from discharge tab:    Expected Discharge Date: 11/14/2022, 12:00 PM  Discharge Delays: Placement - TCU    Discharge Comments: needs tcu and there is chance tcu will not take til 11/14         Overnight Events/Subjective/Notable results:    No complaints.  No pain.  Feels ready to leave hospital   2 brief runs of NSVT yesterday.   k and mg replaced  4 point ROS otherwise negative    Objective    Vital signs in last 24 hours  Temp:  [97.7  F (36.5  C)-98.2  F (36.8  C)] 97.9  F (36.6  C)  Pulse:  [63-87] 79  Resp:  [20-22] 22  BP: ()/(46-58) 102/56  SpO2:  [92 %-97 %] 95 % O2 Device: None (Room air)    Weight:   179 lbs 14.4 oz    Intake/Output last 3 shifts  I/O last 3 completed shifts:  In: 290 [P.O.:290]  Out: 1250 [Urine:1250]  Body mass index is 23.1 kg/m .    Physical Exam  General:  Alert, cooperative, no distress , weak and frail  Neurologic:   facial symmetry preserved, fluent speech.   Psych: calm, mood and affect appropriate to situation  HEENT:  Anicteric, MMM  CV: RRR no MRG, normal S1 and S2, no edema  Lungs: CTAB.  Easyrespirations  Abd: soft, NT, normoactive BS  Skin: no rashes or jaundice noted on exposed skin.  decubitus ulcer covered  Shepard    I have reviewed all labs, medications, imaging studies in the last 24 hours.  Pertinent findings&changes discussed above.    Data       Sheyla Jerry MD, [facility]  Internal Medicine Hospitalist

## 2022-11-13 NOTE — PLAN OF CARE
"Goal Outcome Evaluation:                  VSS.  Forgetful.  Denies pain.  Dressings intact. Clustering cares.   SL. Tele monitoring. Afib with BBB.  Care plan revewed.       Problem: Plan of Care - These are the overarching goals to be used throughout the patient stay.    Goal: Plan of Care Review  Description: The Plan of Care Review/Shift note should be completed every shift.  The Outcome Evaluation is a brief statement about your assessment that the patient is improving, declining, or no change.  This information will be displayed automatically on your shift note.  Outcome: Progressing  Goal: Patient-Specific Goal (Individualized)  Description: You can add care plan individualizations to a care plan. Examples of Individualization might be:  \"Parent requests to be called daily at 9am for status\", \"I have a hard time hearing out of my right ear\", or \"Do not touch me to wake me up as it startles me\".  Outcome: Progressing  Goal: Absence of Hospital-Acquired Illness or Injury  Outcome: Progressing  Intervention: Identify and Manage Fall Risk  Recent Flowsheet Documentation  Taken 11/13/2022 0100 by Emili Pratt RN  Safety Promotion/Fall Prevention: activity supervised  Intervention: Prevent Skin Injury  Recent Flowsheet Documentation  Taken 11/13/2022 0100 by Emili Pratt RN  Body Position:   right   position changed independently  Goal: Optimal Comfort and Wellbeing  Outcome: Progressing  Goal: Readiness for Transition of Care  Outcome: Progressing     Problem: Risk for Delirium  Goal: Optimal Coping  Outcome: Progressing  Goal: Improved Behavioral Control  Outcome: Progressing  Goal: Improved Attention and Thought Clarity  Outcome: Progressing  Goal: Improved Sleep  Outcome: Progressing     Problem: Skin Injury Risk Increased  Goal: Skin Health and Integrity  Outcome: Progressing  Intervention: Optimize Skin Protection  Recent Flowsheet Documentation  Taken 11/13/2022 0100 by Terence" Emili MORAES RN  Activity Management: activity adjusted per tolerance  Head of Bed (HOB) Positioning: HOB at 20 degrees     Problem: Fall Injury Risk  Goal: Absence of Fall and Fall-Related Injury  Outcome: Progressing  Intervention: Promote Injury-Free Environment  Recent Flowsheet Documentation  Taken 11/13/2022 0100 by Emili Pratt RN  Safety Promotion/Fall Prevention: activity supervised     Problem: Activity Intolerance  Goal: Enhanced Capacity and Energy  Outcome: Progressing  Intervention: Optimize Activity Tolerance  Recent Flowsheet Documentation  Taken 11/13/2022 0100 by Emili Pratt, RN  Activity Management: activity adjusted per tolerance     Problem: UTI (Urinary Tract Infection)  Goal: Improved Infection Symptoms  Outcome: Progressing     Problem: Dysrhythmia  Goal: Normalized Cardiac Rhythm  Outcome: Progressing

## 2022-11-13 NOTE — PROGRESS NOTES
[facility]    Medicine Progress Note - Hospitalist Service    Date of Admission:  11/8/2022     Assessment & Plan   SUMMARY:  89 year old male with history of afib on wafarin, aortic stenosis s/p TAVR, non-ambulatory with chronic decubitus ulcer,   Harish Kirkland is a 89 year old male admitted on 11/8/2022. He was sent to the ED by ambulance from independent living facility for evaluation of increased weakness, decreased mobility and low back/left hip pain.    Active Problems   #Elevated troponin, hx of CAD, no evidence for acute injury  -High-sensitivity troponin T 123 and 121  -Patient denies angina symptoms  -EKG showed nonspecific changes  -Telemetry monitoring  -Cardiology consult-no further work up    #Hx of bio valve AVR  -echo ok    #Permanent  A fib   -Rate is controlled, continue PTA metoprolol  -Supratherapeutic INR 5.28 at admission, on warfarin  - pharmacy to dose    #Acute diastolic congestive heart failure  Consult cardiology  IV diuretics, no changed back to oral maintenance    #LV hypertrophy with cardiomyopathy  -Echocardiogram to evaluate structure and function  1. Left ventricular size is normal. Mild concentric increase in wall  thickness. Systolic function is mildly reduced with global hypokinesis and  [id number] variation due to the presence of atrial fibrillation. The  estimated left ventricular ejection fraction is 45-50%.  2. Right ventricular size is mildly enlarged. Systolic function is mildly  reduced.  3. Moderate biatrial enlargement.  4. Negative bubble study suggesting against the presence of significant  cardiac shunting.  5. A well-seated bioprosthetic aortic valve (unknown size or ) is  present. No significant prosthetic stenosis with peak forward velocity 1.6  m/s, mean gradient 6 mm Hg, and dimensionless index 0.47. There is mild-to-  moderate highly eccentric prosthetic regurgitation which appears valvular  rather than paravalvular.  6.  Moderate functional tricuspid regurgitation.  7. Mild pulmonary hypertension is present with an estimated pulmonary artery  systolic pressure of 36 mm Hg.  8. No prior study available for comparison.    #Generalized weakness  -Likely in the setting of recent fall, deconditioning and UTI  -Patient can transfer but spends most of the time on a wheelchair or recliner  -Supportive management  -Fall precautions  -PT/OT evaluation and treatment  - for disposition, need TCU at discharge  -check [id number]  -check [id number]  -check b1, daily etoh per son, added thiamine po     Acute cystitis without hematuria  -Complete course of omnicef  -Follow-up urine culture     Last ischial tuberosity stage II pressure ulcer, present on admission  CT scan showed evidence of ischial bursa-probable developing decubitus ulcer in the left ischiorectal fossa, there is soft tissue in this region, extending to the left ischial tuberosity.  Reactive sclerosis and hypertrophic changes in the left ischium likely reactive, no definite findings of osteomyelitis.  -Wound care consult     Hypokalemia  -Replaced per protocol    Chronic kidney disease stage IIIa  -Stable renal function  -Monitor volume status  -Avoid nephrotoxins     Chronic anemia  -Stable hemoglobin without signs of acute blood loss     Dyslipidemia  -Continue PTA simvastatin     BPH, urinary retention  -Continue PTA finasteride, tamsulosin     DVT prophylaxis:    Medical:  warfarin    Mechanical:  PCD's       Expected Discharge Date: 11/14/2022,  1:00 PM      Discharge Comments: Accepted for [facility] Monday at 1 PM  Wheelchair transport has been scheduled       Diet: Orders Placed This Encounter      Combination Diet Low Saturated Fat Na <2400mg Diet, No Caffeine Diet    Shepard Catheter: Present  Central Lines/Port-a-cath: Not present  Drains: Not present     --------------------------------------------    The patient's care was discussed with the Patient.    Alexx  "MINA Puckett MD  Hospitalist Service  [facility]  Securely message with the [employer] Web Console (learn more here)  Text page via [employer] Paging/Directory    Clinically Significant Risk Factors Present on Admission             ______________________________________________________________________    Interval History   Patient reports chronic indwelling Shepard for many years, chronically wheelchair-bound.    ROS: Denies chest pain, denies shortness of breath, Moving bowels well, passing urine well, slept well and good appetite    Data personally reviewed from the last 24 hours:   Reviewed Laboratory results, Consultant recommendations and medications     Physical Exam   /75 (BP Location: Right arm)   Pulse 75   Temp 97.8  F (36.6  C) (Oral)   Resp 18   Ht 1.88 m (6' 2\")   Wt 81.6 kg (179 lb 14.4 oz)   SpO2 96%   BMI 23.10 kg/m      Physical Exam    General Appearance:    HEENT:  Awake, Alert, Cooperative, in no distress and appears stated age   Normocephalic, atraumatic, conjunctiva clear without icterus and ears without discharge   Lungs:   Clear to auscultation bilaterally, no wheezing, good air exchange, normal work of breathing   Cardiovascular:  Regular Rate and Rythm, normal apical impulse, normal S1 and S2, no lower extremity edema bilaterally   Abdomen: Soft, non-tender and Non-distended, active bowel sounds   Skin:  Skin color, texture normal and bruising or bleeding. No rashes or lesions over face, neck, arms and legs, turgor normal.   Neurologic:    Neuropsychiatric: Alert & Oriented X 3, Facial symmetry preserved and upper & lower extremities moving well with symmetry  Calm, normal eye contact, Affect normal     Data   Recent Labs   Lab 11/13/22  0623 11/12/22  0642 11/11/22  0641   WBC 8.0 7.0 6.5   HGB 9.9* 10.0* 9.9*   MCV 87 88 88    225 234   INR 2.60* 2.41* 2.67*    139 138   POTASSIUM 4.0 4.0 3.5   CHLORIDE 108* 105 104   CO2 22 24 24   BUN 20.9 17.9 19.3 "   CR 0.85 0.89 0.92   ANIONGAP 7 10 10   SHENG 8.1* 8.4* 8.4*   GLC 95 90 86

## 2022-11-13 NOTE — PLAN OF CARE
Goal Outcome Evaluation:         Problem: Plan of Care - These are the overarching goals to be used throughout the patient stay.    Goal: Optimal Comfort and Wellbeing  Outcome: Progressing     Problem: Risk for Delirium  Goal: Improved Attention and Thought Clarity  Outcome: Progressing     Problem: Fall Injury Risk  Goal: Absence of Fall and Fall-Related Injury  Outcome: Progressing  Intervention: Identify and Manage Contributors  Recent Flowsheet Documentation  Taken 11/12/2022 1751 by Larry Sanchez RN  Medication Review/Management: medications reviewed  Intervention: Promote Injury-Free Environment  Recent Flowsheet Documentation  Taken 11/12/2022 1751 by Larry Sanchez RN  Safety Promotion/Fall Prevention: supervised activity     Problem: UTI (Urinary Tract Infection)  Goal: Improved Infection Symptoms  Outcome: Progressing     Pt AxO x3 disoriented to time, VSS on room air, denies pain, pt had stated some anxiety tonight d/t blindness appeared slightly frustrated, MG 2.2 K 4.0, pt on air mattress, hagen patent, sleeping between cares, tele afib w/BBB.

## 2022-11-13 NOTE — PROGRESS NOTES
Care Management Follow Up    Length of Stay (days): 5    Expected Discharge Date: 11/14/2022     Concerns to be Addressed:     Placement tomorrow  Patient plan of care discussed at interdisciplinary rounds: Yes    Anticipated Discharge Disposition: Transitional Care     Anticipated Discharge Services:    Anticipated Discharge DME:      Referrals Placed by CM/SW:    Private pay costs discussed: Not applicable    Additional Information:    Pt has been accepted for [facility] on Monday. Wheelchair transport set up for 1300 on 11/14. CM following.     PAS completed, printed, and HUC/Charge RN updated. [id number].      RAY AlSW         "    Subjective     Park Castillo, 5 y.o. female, presents with follow up R. OM and L. cerumen impaction. She was seen in urgent care on this past Monday and currently on Augmentin. No fever, she states that overall she is feeling better. She is using Debrox for L. cerumen impaction..  Symptoms started 3 days ago.  She is taking fluids well.  There are no other significant complaints.    Objective     Pulse 104   Temp 98.2 °F (36.8 °C) (Tympanic)   Ht 3' 9.5" (1.156 m)   Wt 28.4 kg (62 lb 9.8 oz)   BMI 21.26 kg/m²     General appearance:  well developed and well nourished   Nasal:  Neck:  Mild nasal congestion with clear rhinorrhea  Neck is supple   Ears:  External ears are normal  Right TM - erythematous, dull and purulent middle ear fluid  Left TM - dull mild debris in ear canal,likely from debrox drops   Oropharynx:  Mucous membranes are moist; there is mild erythema of the posterior pharynx   Lungs:  Lungs are clear to auscultation   Heart:  Regular rate and rhythm; no murmurs or rubs   Skin:  No rashes or lesions noted     Assessment     Acute right otitis media    Plan     1) Antibiotics per orders. Continue Augmentin, see orders  2) Fluids, acetaminophen as needed  3) Recheck if symptoms persist for 2 or more days, symptoms worsen, or new symptoms develop.  "

## 2022-11-13 NOTE — PLAN OF CARE
Goal Outcome Evaluation:                  Pt. Reports wanting to leave the world this morning. Denied pain. Said feels so tired not able to do anything. Reported in afternoon feeling bad and needing to poop. Suppository and senna given and small output of stool produced. To TCU tomorrow. Water encouraged. Metoprolol held for hypotension this morning.

## 2022-11-14 PROBLEM — N30.00 ACUTE CYSTITIS WITHOUT HEMATURIA: Status: ACTIVE | Noted: 2022-01-01

## 2022-11-14 PROBLEM — R33.9 RETENTION OF URINE: Status: ACTIVE | Noted: 2022-01-11

## 2022-11-14 PROBLEM — M48.00 SPINAL STENOSIS: Status: ACTIVE | Noted: 2022-01-01

## 2022-11-14 PROBLEM — M48.062 SPINAL STENOSIS OF LUMBAR REGION WITH NEUROGENIC CLAUDICATION: Status: ACTIVE | Noted: 2017-08-21

## 2022-11-14 PROBLEM — Z95.3 S/P TAVR (TRANSCATHETER AORTIC VALVE REPLACEMENT), BIOPROSTHETIC: Status: ACTIVE | Noted: 2017-10-26

## 2022-11-14 NOTE — DISCHARGE SUMMARY
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    Discharge Diagnoses   #Elevated troponin, hx of CAD, no evidence for acute injury  -High-sensitivity troponin T 123 and 121  -Patient denies angina symptoms  -EKG showed nonspecific changes  -Telemetry monitoring  -Cardiology consult-no further work up recommended at this time     #Hx of bio valve AVR  -echo ok     #Permanent  A fib   -Rate is controlled, continue PTA metoprolol  -Supratherapeutic INR 5.28 at admission, on warfarin  -Held warfarin until INR back to therapeutic level  -resume home dosing     #Acute diastolic congestive heart failure  Consulted cardiology  Treated successfully with IV diuretics, now changed back to oral maintenance     #LV hypertrophy with cardiomyopathy  -Echocardiogram to evaluate structure and function  1. Left ventricular size is normal. Mild concentric increase in wall  thickness. Systolic function is mildly reduced with global hypokinesis and  wapq-bi-qgpl variation due to the presence of atrial fibrillation. The  estimated left ventricular ejection fraction is 45-50%.  2. Right ventricular size is mildly enlarged. Systolic function is mildly  reduced.  3. Moderate biatrial enlargement.  4. Negative bubble study suggesting against the presence of significant  cardiac shunting.  5. A well-seated bioprosthetic aortic valve (unknown size or ) is  present. No significant prosthetic stenosis with peak forward velocity  1.6  m/s, mean gradient 6 mm Hg, and dimensionless index 0.47. There is mild-to-  moderate highly eccentric prosthetic regurgitation which appears valvular  rather than paravalvular.  6. Moderate functional tricuspid regurgitation.  7. Mild pulmonary hypertension is present with an estimated pulmonary artery  systolic pressure of 36 mm Hg.  8. No prior study available for comparison.     #Generalized weakness  -Likely in the setting of recent fall, deconditioning and UTI  -Patient can transfer but spends most of the time on a wheelchair or recliner  -Supportive management  -Fall precautions  -PT/OT evaluation and treatment  -TCU at discharge  -tsh-2.39  -b12-259  -b1 pending.  -daily etoh per son, added thiamine po     Acute cystitis without hematuria  -Complete course of omnicef     Left ischial tuberosity stage II pressure ulcer, present on admission  CT scan showed evidence of ischial bursa-probable developing decubitus ulcer in the left ischiorectal fossa, there is soft tissue in this region, extending to the left ischial tuberosity.  Reactive sclerosis and hypertrophic changes in the left ischium likely reactive, no definite findings of osteomyelitis.  -Wound care consult completed     Hypokalemia  -Replaced per protocol, continue maintenance replacement     Chronic kidney disease stage IIIa  -Stable renal function  -Monitor volume status  -Avoid nephrotoxins     Chronic anemia  -Stable hemoglobin without signs of acute blood loss     Dyslipidemia  -Continue PTA simvastatin     BPH, urinary retention  -Continue PTA finasteride, tamsulosin     Follow-ups Needed After Discharge     Unresulted Labs Ordered in the Past 30 Days of this Admission     Date and Time Order Name Status Description    11/9/2022 11:51 AM Vitamin B1 whole blood In process       These results will be followed up by Primary care    Discharge Disposition   Discharged to rehabilitation facility  Condition at discharge: Stable    Consultations  This Hospital Stay   WOUND OSTOMY CONTINENCE NURSE  IP CONSULT  CARDIOLOGY IP CONSULT  CARE MANAGEMENT / SOCIAL WORK IP CONSULT  PHYSICAL THERAPY ADULT IP CONSULT  OCCUPATIONAL THERAPY ADULT IP CONSULT  PHARMACY TO DOSE WARFARIN  PHYSICAL THERAPY ADULT IP CONSULT  OCCUPATIONAL THERAPY ADULT IP CONSULT    Code Status   Full Code    Time Spent on this Encounter   I, Alexx Puckett MD, personally saw the patient today and spent less than or equal to 30 minutes discharging this patient.       Alexx Puckett MD  [address] [address]  Phone: [phone]  Fax: [phone]  ______________________________________________________________________    Physical Exam   Vital Signs: Temp: 98.1  F (36.7  C) Temp src: Oral BP: 109/58 Pulse: 64   Resp: 18 SpO2: 96 % O2 Device: None (Room air)    Weight: 179 lbs 14.4 oz  Constitutional: awake, alert, cooperative, no apparent distress, and appears stated age  ENT: normocepalic, without obvious abnormality, atramatic  Respiratory: No increased work of breathing, good air exchange, clear to auscultation bilaterally, no crackles or wheezing  Cardiovascular: normal apical pulses  and normal S1 and S2  GI: normal bowel sounds, soft and non-distended  Neurologic: Mental Status Exam:  Level of Alertness:   awake  Orientation:   person, place, time  Memory:   normal  Motor Exam:  moves all extremities well and symmetrically  Sensory:  Sensory intact       Primary Care Physician   Tre Arias    Discharge Orders   No discharge procedures on file.    Significant Results and Procedures   Most Recent 3 CBC's:Recent Labs   Lab Test 11/14/22  0557 11/13/22  0623 11/12/22  0642   WBC 6.5 8.0 7.0   HGB 10.3* 9.9* 10.0*   MCV 87 87 88    251 225     Most Recent 3 BMP's:Recent Labs   Lab Test 11/14/22  0557 11/13/22  0623 11/12/22  0642   * 137 139   POTASSIUM 3.9 4.0 4.0   CHLORIDE 102 108* 105   CO2 21* 22 24   BUN  18.7 20.9 17.9   CR 0.79 0.85 0.89   ANIONGAP 9 7 10   SHENG 8.3* 8.1* 8.4*   GLC 88 95 90     Most Recent 2 LFT's:Recent Labs   Lab Test 01/13/22  0736   AST 21   ALT 10   ALKPHOS 99   BILITOTAL 0.8   ,   Results for orders placed or performed during the hospital encounter of 11/08/22   Head CT w/o contrast    Narrative    EXAM: CT HEAD W/O CONTRAST  LOCATION: [facility]  DATE/TIME: 11/8/2022 6:36 PM    INDICATION: fall, head injury, anticoagulated  COMPARISON: CT head 11/03/2022  TECHNIQUE: Routine CT Head without IV contrast. Multiplanar reformats. Dose reduction techniques were used.    FINDINGS:  INTRACRANIAL CONTENTS: No intracranial hemorrhage, extraaxial collection, or mass effect.  No CT evidence of acute infarct. Mild presumed chronic small vessel ischemic changes. Focal small encephalomalacia left parieto-occipital region. Mild generalized   volume loss. No hydrocephalus.     VISUALIZED ORBITS/SINUSES/MASTOIDS: Prior left cataract surgery. Visualized portions of the orbits are otherwise unremarkable. Mild mucosal thickening primarily involving the left ethmoid air cells. Mild fluid/membrane thickening in the left and mastoid   air cells at the tip. No apparent mass in the posterior nasopharynx or skull base.    BONES/SOFT TISSUES: No acute abnormality.      Impression    IMPRESSION:  1.  No acute intracranial process.   CT Hip Left w/o Contrast    Narrative    EXAM: CT HIP LEFT W/O CONTRAST  LOCATION: [facility]  DATE/TIME: 11/8/2022 6:38 PM    INDICATION: 89-year-old patient with left-sided hip pain after a fall.  COMPARISON: None.  TECHNIQUE: Noncontrast. Axial, sagittal and coronal thin-section reconstruction. Dose reduction techniques were used.     FINDINGS:     BONES AND JOINTS:  -Bone demineralization.  -No proximal femur fracture.  -Sclerotic and hypertrophic changes of the left ischium, with adjacent soft tissue heterotopic ossification.  Normal  joint alignment.    SOFT TISSUES:  -Moderate fatty atrophy of the left gluteus minimus and medius muscles.  -Soft tissue thickening superficial to the left greater trochanter.  -Soft tissue thickening in the left ischiorectal subcutaneous tissues, extending to the left ischium.  -Small left bladder diverticulum.       Impression    IMPRESSION:  1.  No fracture or joint malalignment.  2.  Bone demineralization.  3.  Adventitial bursa-probable developing decubitus ulcer in the left ischiorectal fossa. There is soft tissue in this region, extending to the left ischial tuberosity.  4.  Reactive sclerosis and hypertrophic changes in the left ischium, likely reactive to the above. No definitive findings of osteomyelitis (bone destruction or fragmentation). If this is on the differential, MRI is recommended in further evaluation.     Lumbar spine CT w/o contrast    Narrative    EXAM: CT LUMBAR SPINE W/O CONTRAST  LOCATION: [facility]  DATE/TIME: 11/8/2022 6:37 PM    INDICATION: fall, pain  COMPARISON: CT 08/07/2017  TECHNIQUE: Routine CT Lumbar Spine without IV contrast. Multiplanar reformats. Dose reduction techniques were used.     FINDINGS:  VERTEBRA: Normal vertebral body heights. No fracture or posttraumatic subluxation. Slight retrolisthesis L5-S1. Status post posterior instrumented fusion with decompression L1-L3. Hardware is intact. Osteopenic bones.    CANAL/FORAMINA: Advanced degenerative changes. No canal or neural foraminal stenosis.    PARASPINAL: No extraspinal abnormality.      Impression    IMPRESSION:  1.  No acute fracture or posttraumatic subluxation.     XR Chest 1 View    Narrative    EXAM: XR CHEST 1 VIEW  LOCATION: [facility]  DATE/TIME: 11/8/2022 6:37 PM    INDICATION: weakness  COMPARISON: None.      Impression    IMPRESSION: Heart size prominent on this AP view. Pulmonary vascularity normal. Subsegmental atelectasis or scarring left base, lungs  otherwise clear.   Echo Complete with Bubble Study     Value    LVEF  45-50%    [facility]    [id number]  [id number]  [id number]  [id number]^JOSEFINA^MIKE^[place] [address]     Name: CLEO ARCOS  MRN: [id number]  : 1933  Study Date: 2022 02:08 PM  Age: 89 yrs  Gender: Male  Patient Location: [facility]  Reason For Study: Acute Myocardial Infarction  Ordering Physician: MIKE ROCHA  Performed By: SANTY     BSA: 2.0 m2  Height: 74 in  Weight: 162 lb  HR: 76  ______________________________________________________________________________  Procedure  Complete Portable Echo Adult. Definity (NDC #18720-904) given intravenously.  Technically difficult study. There is no comparison study available.  ______________________________________________________________________________  Interpretation Summary     1. Left ventricular size is normal. Mild concentric increase in wall  thickness. Systolic function is mildly reduced with global hypokinesis and  zqym-ol-hxmf variation due to the presence of atrial fibrillation. The  estimated left ventricular ejection fraction is 45-50%.  2. Right ventricular size is mildly enlarged. Systolic function is mildly  reduced.  3. Moderate biatrial enlargement.  4. Negative bubble study suggesting against the presence of significant  cardiac shunting.  5. A well-seated bioprosthetic aortic valve (unknown size or ) is  present. No significant prosthetic stenosis with peak forward velocity 1.6  m/s, mean gradient 6 mm Hg, and dimensionless index 0.47. There is mild-to-  moderate highly eccentric prosthetic regurgitation which appears valvular  rather than paravalvular.  6. Moderate functional tricuspid regurgitation.  7. Mild pulmonary hypertension is present with an estimated pulmonary artery  systolic pressure of 36 mm Hg.  8. No prior study available for  comparison.  ______________________________________________________________________________  I      WMSI = 2.00     % Normal = 0     X - Cannot   0 -                      (2) - Mildly 2 -          Segments  Size  Interpret    Hyperkinetic 1 - Normal  Hypokinetic  Hypokinetic  1-2     small                                                     7 -          3-5      moderate  3 - Akinetic 4 -          5 -         6 - Akinetic Dyskinetic   6-14    large               Dyskinetic   Aneurysmal  w/scar       w/scar       15-16   diffuse     Left Ventricle  The left ventricle is normal in size. There is mild concentric left  ventricular hypertrophy. The visual ejection fraction is 45-50%. Systolic  function is mildly reduced with xawz-on-tqeo variation due to the presence of  atrial fibrillation. Diastolic function not assessed due to atrial  fibrillation. There is mild global hypokinesia of the left ventricle. Septal  motion is consistent with conduction abnormality.     Right Ventricle  The right ventricle is mildly dilated. Mildly decreased right ventricular  systolic function.     Atria  The left atrium is moderately dilated. The right atrium is moderately dilated.  A contrast injection (Bubble Study) was performed that was negative for flow  across the interatrial septum.     Mitral Valve  The mitral valve leaflets are mildly thickened. There is mild mitral annular  calcification. There is mild (1+) mitral regurgitation. There is no mitral  valve stenosis.     Tricuspid Valve  Tricuspid valve leaflets appear normal. There is dilated tricuspid annulus.  There is moderate (2+) tricuspid regurgitation. The right ventricular systolic  pressure is elevated at 33.1 mmHg. Mild (35-45mmHg) pulmonary hypertension is  present. There is no tricuspid stenosis.     Aortic Valve  A well-seated bioprosthetic aortic valve (unknown size or ) is  present. No significant prosthetic stenosis with peak forward velocity 1.6  m/s,  mean gradient 6 mm Hg, and dimensionless index 0.47. There is mild-to-  moderate highly eccentric prosthetic regurgitaton which appears valvular  rather than paravalvular.     Pulmonic Valve  The pulmonic valve is not well visualized. There is mild (1+) pulmonic  valvular regurgitation. There is no pulmonic valvular stenosis.     Vessels  The aorta root cannot be assessed. The thoracic aorta cannot be assessed. IVC  diameter <2.1 cm collapsing >50% with sniff suggests a normal RA pressure of 3  mmHg.     Pericardium  There is no pericardial effusion.     Rhythm  The rhythm was atrial fibrillation.     ______________________________________________________________________________  MMode/2D Measurements & Calculations  IVSd: 1.2 cm  LVIDd: 4.3 cm  LVIDs: 2.7 cm  LVPWd: 1.2 cm     FS: 36.8 %  LV mass(C)d: 185.2 grams  LV mass(C)dI: 93.2 grams/m2  LA dimension: 4.2 cm  LA Volume (BP): 62.8 ml  LA Volume Index (BP): 42.0 ml/m2  RWT: 0.56     Doppler Measurements & Calculations  MV E max andreas: 118.0 cm/sec  MV max P.8 mmHg  MV mean P.1 mmHg  MV V2 VTI: 20.4 cm     MV dec slope: 674.7 cm/sec2  MV dec time: 0.17 sec  Ao V2 max: 163.0 cm/sec  Ao max P.0 mmHg  Ao V2 mean: 117.5 cm/sec  Ao mean P.1 mmHg  Ao V2 VTI: 29.5 cm  LV V1 max P.4 mmHg  LV V1 max: 77.1 cm/sec  LV V1 VTI: 14.5 cm  PA V2 max: 82.9 cm/sec  PA max P.7 mmHg  PA mean P.5 mmHg  PA V2 VTI: 14.5 cm  PA acc time: 0.07 sec  PI end-d andreas: 101.2 cm/sec  TR max andreas: 287.8 cm/sec  TR max P.1 mmHg  AV Andreas Ratio (DI): 0.47  E/E': 17.9  E/E' av.4  Lateral E/e': 17.7  Medial E/e': 23.1  Peak E' Andreas: 6.6 cm/sec     ______________________________________________________________________________  Report approved by: Danae Quiles 2022 03:15 PM               Discharge Medications   Current Discharge Medication List      CONTINUE these medications which have NOT CHANGED    Details   aspirin 81 MG EC tablet Take 81 mg by mouth  daily      finasteride (PROSCAR) 5 MG tablet Take 5 mg by mouth daily      furosemide (LASIX) 20 MG tablet Take 20 mg by mouth 2 times daily      metoprolol tartrate (LOPRESSOR) 100 MG tablet Take 150 mg by mouth daily      Multiple Vitamins-Minerals (PRESERVISION AREDS) TABS Take 2 tablets by mouth 2 times daily      psyllium (METAMUCIL/KONSYL) Packet Take 1 packet by mouth daily      simvastatin (ZOCOR) 40 MG tablet Take 40 mg by mouth daily      tamsulosin (FLOMAX) 0.4 MG capsule Take 0.4 mg by mouth daily      warfarin ANTICOAGULANT (COUMADIN) 1 MG tablet Take 1-2 mg by mouth daily 2mg MWF; 1mg all other days           Allergies   No Known Allergies

## 2022-11-14 NOTE — PLAN OF CARE
Physical Therapy Discharge Summary    Reason for therapy discharge:    Discharged to transitional care facility.    Progress towards therapy goal(s). See goals on Care Plan in [facility] electronic health record for goal details.  Goals partially met.  Barriers to achieving goals:   discharge from facility.    Therapy recommendation(s):    Continued therapy is recommended.  Rationale/Recommendations:  recommend continued PT at TCU.     Rebeka Levi, PT  11/14/2022

## 2022-11-14 NOTE — PROGRESS NOTES
Care Management Discharge Note    Discharge Date: 11/14/2022       Discharge Disposition: Transitional Care    Discharge Services:  (therapy at TCU)    Discharge DME:      Discharge Transportation: agency (Wheelchair transport for Monday at 1300.)    Private pay costs discussed: transportation costs    PAS Confirmation Code:  ([id number])  Patient/family educated on Medicare website which has current facility and service quality ratings:      Education Provided on the Discharge Plan:    Persons Notified of Discharge Plans: MD placed orders; facility requested ride time  Patient/Family in Agreement with the Plan: yes    Handoff Referral Completed: Yes    Additional Information:    Pt has been accepted for [facility] TCU. Wheelchair transport set up for 1300 today. SW left VM for facility this morning re: update on discharge plan and ride set. PAS completed. CM following.       JUDSON Al

## 2022-11-14 NOTE — PLAN OF CARE
Goal Outcome Evaluation:         Problem: Plan of Care - These are the overarching goals to be used throughout the patient stay.    Goal: Optimal Comfort and Wellbeing  Outcome: Progressing     Problem: Risk for Delirium  Goal: Improved Attention and Thought Clarity  Outcome: Progressing     Problem: Risk for Delirium  Goal: Improved Sleep  Outcome: Progressing     Pt AxO x4, VSS on room air, denies pain, sleeping between cares appears emotionally depressed at times pt stated being okay, x2 large Bms this shift, hagen catheter patent, K+ 4.0/Mg 2.1 recheck AM, tele Afib BBB, mepilex dressing CDI, refused dinner tonight offered snacks multiple times denied.

## 2022-11-14 NOTE — PLAN OF CARE
Occupational Therapy Discharge Summary    Reason for therapy discharge:    Discharged to transitional care facility.    Progress towards therapy goal(s). See goals on Care Plan in [facility] electronic health record for goal details.  Goals partially met.  Barriers to achieving goals:   discharge from facility.    Therapy recommendation(s):    Recommend continued OT at TCU

## 2022-11-15 NOTE — PROGRESS NOTES
[facility]    Background: Transitional Care Management program identified per system criteria and reviewed by [facility] team for possible outreach.    Assessment: Upon chart review, CCR Team member will not proceed with patient outreach related to this episode of Transitional Care Management program due to reason below:    Non-[facility] TCU: Patient is not established within [facility] and CCRC team member noted patient discharged to TCU/ARU/LTACH.    Plan: Transitional Care Management episode addressed appropriately per reason noted above.      JACKI Brito  [facility] [place], [facility]    *Connected Care Resource Team does NOT follow patient ongoing. Referrals are identified based on internal discharge reports and the outreach is to ensure patient has an understanding of their discharge instructions.

## 2023-01-01 ENCOUNTER — LAB REQUISITION (OUTPATIENT)
Dept: LAB | Facility: CLINIC | Age: 88
End: 2023-01-01
Payer: MEDICARE

## 2023-01-01 ENCOUNTER — TELEPHONE (OUTPATIENT)
Dept: ANTICOAGULATION | Facility: CLINIC | Age: 88
End: 2023-01-01
Payer: MEDICARE

## 2023-01-01 ENCOUNTER — DOCUMENTATION ONLY (OUTPATIENT)
Dept: ANTICOAGULATION | Facility: CLINIC | Age: 88
End: 2023-01-01
Payer: MEDICARE

## 2023-01-01 ENCOUNTER — TRANSFERRED RECORDS (OUTPATIENT)
Dept: HEALTH INFORMATION MANAGEMENT | Facility: CLINIC | Age: 88
End: 2023-01-01
Payer: MEDICARE

## 2023-01-01 DIAGNOSIS — I48.20 CHRONIC ATRIAL FIBRILLATION, UNSPECIFIED (H): ICD-10-CM

## 2023-01-01 DIAGNOSIS — N39.0 URINARY TRACT INFECTION, SITE NOT SPECIFIED: ICD-10-CM

## 2023-01-01 DIAGNOSIS — D68.318 OTHER HEMORRHAGIC DISORDER DUE TO INTRINSIC CIRCULATING ANTICOAGULANTS, ANTIBODIES, OR INHIBITORS (H): ICD-10-CM

## 2023-01-01 DIAGNOSIS — L89.323 PRESSURE ULCER OF LEFT BUTTOCK, STAGE 3 (H): ICD-10-CM

## 2023-01-01 DIAGNOSIS — N30.00 ACUTE CYSTITIS WITHOUT HEMATURIA: ICD-10-CM

## 2023-01-01 LAB
ALBUMIN UR-MCNC: 300 MG/DL
APPEARANCE UR: ABNORMAL
ATRIAL RATE - MUSE: 357 BPM
ATRIAL RATE - MUSE: 85 BPM
BACTERIA #/AREA URNS HPF: ABNORMAL /HPF
BACTERIA UR CULT: ABNORMAL
BILIRUB UR QL STRIP: NEGATIVE
COLOR UR AUTO: ABNORMAL
DIASTOLIC BLOOD PRESSURE - MUSE: NORMAL MMHG
DIASTOLIC BLOOD PRESSURE - MUSE: NORMAL MMHG
GLUCOSE UR STRIP-MCNC: NEGATIVE MG/DL
HGB UR QL STRIP: ABNORMAL
INTERPRETATION ECG - MUSE: NORMAL
INTERPRETATION ECG - MUSE: NORMAL
KETONES UR STRIP-MCNC: NEGATIVE MG/DL
LEUKOCYTE ESTERASE UR QL STRIP: ABNORMAL
MUCOUS THREADS #/AREA URNS LPF: PRESENT /LPF
NITRATE UR QL: NEGATIVE
P AXIS - MUSE: NORMAL DEGREES
P AXIS - MUSE: NORMAL DEGREES
PH UR STRIP: 7.5 [PH] (ref 5–7)
PR INTERVAL - MUSE: NORMAL MS
PR INTERVAL - MUSE: NORMAL MS
QRS DURATION - MUSE: 140 MS
QRS DURATION - MUSE: 146 MS
QT - MUSE: 446 MS
QT - MUSE: 446 MS
QTC - MUSE: 481 MS
QTC - MUSE: 527 MS
R AXIS - MUSE: -36 DEGREES
R AXIS - MUSE: -53 DEGREES
RBC URINE: >182 /HPF
SP GR UR STRIP: 1.01 (ref 1–1.03)
SYSTOLIC BLOOD PRESSURE - MUSE: NORMAL MMHG
SYSTOLIC BLOOD PRESSURE - MUSE: NORMAL MMHG
T AXIS - MUSE: -49 DEGREES
T AXIS - MUSE: 100 DEGREES
UROBILINOGEN UR STRIP-MCNC: NORMAL MG/DL
VENTRICULAR RATE- MUSE: 70 BPM
VENTRICULAR RATE- MUSE: 84 BPM
WBC CLUMPS #/AREA URNS HPF: PRESENT /HPF
WBC URINE: >182 /HPF

## 2023-01-01 PROCEDURE — 87086 URINE CULTURE/COLONY COUNT: CPT | Mod: ORL | Performed by: NURSE PRACTITIONER

## 2023-01-01 PROCEDURE — 81001 URINALYSIS AUTO W/SCOPE: CPT | Mod: ORL | Performed by: NURSE PRACTITIONER

## 2023-03-08 NOTE — TELEPHONE ENCOUNTER
Fax received from [facility].   03/08/23 8:25 HE    - noted MH has not dosed patient while at [facility] - per fax sent since 2/20/23 [facility]   Electronically signed by Micheal Diaz, OCTAVIO, APRN, has been dosing patient. (see care everywhere)    This RN called all 5 [facility] nursing station numbers listed on fax with no answer - unknown what floor patient is on.      - need to know if [facility] is to start dosing patient while at [facility] or if HE above provider is going to continue?

## 2023-03-08 NOTE — TELEPHONE ENCOUNTER
Spoke with [facility] RN. Confirmed this fax received by [facility] in error.  Patient has been dosed by [facility] provider.

## 2023-03-13 NOTE — TELEPHONE ENCOUNTER
[facility]. Patient INR management is through [facility]. KEO Ricardo will check into who is managing patient. She will conatct [facility] if provider has changed.

## 2023-04-14 NOTE — PROGRESS NOTES
Received INIR result from [facility]. Patient is not managed by [facility]. RN at facility notified and she confirmed fax sent in error.

## 2023-05-15 NOTE — PROGRESS NOTES
Care Management Follow Up    Length of Stay (days): 2    Expected Discharge Date: 11/11/2022     Concerns to be Addressed:       Patient plan of care discussed at interdisciplinary rounds: Yes    Anticipated Discharge Disposition:       Anticipated Discharge Services:    Anticipated Discharge DME:      Patient/family educated on Medicare website which has current facility and service quality ratings:    Education Provided on the Discharge Plan:    Patient/Family in Agreement with the Plan:      Referrals Placed by CM/SW:    Private pay costs discussed: Not applicable    Additional Information:  RNCM met with patient at bedside. Discussed discharge recommendations, patient stated he wants to stay with [facility] for TCU. Referral sent.     Cyndy Bhakta RN         Total Volume Injected ([facility]- Only Use Numbers And Decimals): .1